# Patient Record
Sex: MALE | Race: WHITE | NOT HISPANIC OR LATINO | Employment: OTHER | URBAN - NONMETROPOLITAN AREA
[De-identification: names, ages, dates, MRNs, and addresses within clinical notes are randomized per-mention and may not be internally consistent; named-entity substitution may affect disease eponyms.]

---

## 2017-05-11 RX ORDER — METOPROLOL SUCCINATE 100 MG/1
100 TABLET, EXTENDED RELEASE ORAL DAILY
COMMUNITY

## 2017-05-11 RX ORDER — ATORVASTATIN CALCIUM 40 MG/1
40 TABLET, FILM COATED ORAL DAILY
COMMUNITY

## 2017-05-11 RX ORDER — ASPIRIN 81 MG/1
81 TABLET ORAL DAILY
COMMUNITY

## 2017-05-11 RX ORDER — FUROSEMIDE 40 MG/1
40 TABLET ORAL DAILY
COMMUNITY

## 2017-05-11 RX ORDER — LEVOTHYROXINE SODIUM 0.1 MG/1
100 TABLET ORAL DAILY
COMMUNITY

## 2017-05-11 RX ORDER — VALSARTAN AND HYDROCHLOROTHIAZIDE 320; 12.5 MG/1; MG/1
1 TABLET, FILM COATED ORAL DAILY
COMMUNITY

## 2017-05-15 ENCOUNTER — OFFICE VISIT (OUTPATIENT)
Dept: CARDIOLOGY | Facility: CLINIC | Age: 77
End: 2017-05-15

## 2017-05-15 VITALS
WEIGHT: 252 LBS | DIASTOLIC BLOOD PRESSURE: 62 MMHG | BODY MASS INDEX: 34.13 KG/M2 | HEART RATE: 77 BPM | SYSTOLIC BLOOD PRESSURE: 130 MMHG | HEIGHT: 72 IN

## 2017-05-15 DIAGNOSIS — I25.10 CAD IN NATIVE ARTERY: ICD-10-CM

## 2017-05-15 DIAGNOSIS — I10 ESSENTIAL HYPERTENSION: ICD-10-CM

## 2017-05-15 DIAGNOSIS — E78.5 HYPERLIPIDEMIA, UNSPECIFIED HYPERLIPIDEMIA TYPE: ICD-10-CM

## 2017-05-15 DIAGNOSIS — R06.02 SOB (SHORTNESS OF BREATH): Primary | ICD-10-CM

## 2017-05-15 PROCEDURE — 99204 OFFICE O/P NEW MOD 45 MIN: CPT | Performed by: INTERNAL MEDICINE

## 2017-05-15 PROCEDURE — 93000 ELECTROCARDIOGRAM COMPLETE: CPT | Performed by: INTERNAL MEDICINE

## 2017-05-15 RX ORDER — OMEGA-3S/DHA/EPA/FISH OIL/D3 300MG-1000
CAPSULE ORAL DAILY
COMMUNITY

## 2017-05-15 RX ORDER — CHOLECALCIFEROL (VITAMIN D3) 25 MCG
CAPSULE ORAL DAILY
COMMUNITY

## 2017-06-02 ENCOUNTER — HOSPITAL ENCOUNTER (OUTPATIENT)
Dept: CARDIOLOGY | Facility: HOSPITAL | Age: 77
End: 2017-06-02
Attending: INTERNAL MEDICINE

## 2017-06-02 ENCOUNTER — HOSPITAL ENCOUNTER (OUTPATIENT)
Dept: CARDIOLOGY | Facility: HOSPITAL | Age: 77
Discharge: HOME | End: 2017-06-02
Attending: INTERNAL MEDICINE

## 2017-06-02 ENCOUNTER — HOSPITAL ENCOUNTER (OUTPATIENT)
Dept: CARDIOLOGY | Facility: HOSPITAL | Age: 77
Discharge: HOME | End: 2017-06-02
Attending: INTERNAL MEDICINE | Admitting: INTERNAL MEDICINE

## 2017-06-02 DIAGNOSIS — R06.02 SOB (SHORTNESS OF BREATH): ICD-10-CM

## 2017-06-02 DIAGNOSIS — I25.10 CAD IN NATIVE ARTERY: ICD-10-CM

## 2017-06-02 PROCEDURE — 0 TECHNETIUM SESTAMIBI: Performed by: INTERNAL MEDICINE

## 2017-06-02 PROCEDURE — A9500 TC99M SESTAMIBI: HCPCS | Performed by: INTERNAL MEDICINE

## 2017-06-02 RX ADMIN — Medication 1 DOSE: at 07:40

## 2017-11-28 ENCOUNTER — TELEPHONE (OUTPATIENT)
Dept: GASTROENTEROLOGY | Facility: CLINIC | Age: 77
End: 2017-11-28

## 2018-03-21 ENCOUNTER — OFFICE VISIT (OUTPATIENT)
Dept: GASTROENTEROLOGY | Facility: CLINIC | Age: 78
End: 2018-03-21

## 2018-03-21 VITALS
OXYGEN SATURATION: 98 % | SYSTOLIC BLOOD PRESSURE: 130 MMHG | HEIGHT: 72 IN | BODY MASS INDEX: 34.13 KG/M2 | WEIGHT: 252 LBS | DIASTOLIC BLOOD PRESSURE: 80 MMHG | HEART RATE: 94 BPM

## 2018-03-21 DIAGNOSIS — Z86.010 HX OF ADENOMATOUS COLONIC POLYPS: Primary | ICD-10-CM

## 2018-03-21 DIAGNOSIS — E66.9 OBESITY, UNSPECIFIED OBESITY SEVERITY, UNSPECIFIED OBESITY TYPE: ICD-10-CM

## 2018-03-21 DIAGNOSIS — I10 ESSENTIAL HYPERTENSION: ICD-10-CM

## 2018-03-21 PROCEDURE — S0260 H&P FOR SURGERY: HCPCS | Performed by: CLINICAL NURSE SPECIALIST

## 2018-03-21 NOTE — PROGRESS NOTES
Kashif Milan  1940      3/21/2018  Chief Complaint   Patient presents with   • Colonoscopy     Subjective   HPI  Kashif Milan is a 77 y.o. male who presents as a referral for preventative maintenance. He has no complaints of nausea or vomiting. No change in bowels. No wt loss. No BRBPR. No melena. There is no family hx for colon cancer. No abdominal pain. Since we have seen him last he has been diagnosed with progressive advanced COPD he is on oxygen daily and he is chronically short of breath he is unsure he wants colonoscopy.  Past Medical History:   Diagnosis Date   • Atrial fibrillation    • CAD in native artery    • Essential hypertension    • GERD (gastroesophageal reflux disease)    • H/O basal cell carcinoma excision     ear x3, 1-13   • H/O spinal fusion    • History of MI (myocardial infarction)    • Hyperlipemia    • Ischemic heart disease    • Myocardial infarction    • Obesity    • S/P CABG (coronary artery bypass graft)    • SOB (shortness of breath)      Past Surgical History:   Procedure Laterality Date   • BACK SURGERY     • CARDIAC CATHETERIZATION Left 10/06/2005    ct surgery consult pending   • CIRCUMCISION     • COLONOSCOPY  02/09/2015    Adenomatous polyp hepatic flexure, 2 hyperplastic polyps at 60 and 30 cm, Diverticulosis repeat exam in 3 years   • CORONARY ARTERY BYPASS GRAFT  04/14/2009    x4, LIMA to LAD, SVG to the intermediate branch, obtuse marginal branch, posterior anterior descending artery   • SPINAL FUSION       Outpatient Prescriptions Marked as Taking for the 3/21/18 encounter (Office Visit) with DARRIAN Archuleta   Medication Sig Dispense Refill   • aspirin 81 MG EC tablet Take 81 mg by mouth Daily.     • atorvastatin (LIPITOR) 40 MG tablet Take 40 mg by mouth Daily.     • Cholecalciferol (VITAMIN D-3) 1000 UNITS capsule Take  by mouth Daily.     • fluticasone-salmeterol (ADVAIR) 250-50 MCG/DOSE DISKUS Inhale 2 (Two) Times a Day.     • furosemide (LASIX) 20  MG tablet Take 40 mg by mouth Daily As Needed.     • levothyroxine (SYNTHROID, LEVOTHROID) 100 MCG tablet Take 100 mcg by mouth Daily.     • metoprolol succinate XL (TOPROL-XL) 100 MG 24 hr tablet Take 100 mg by mouth Daily.     • Multiple Vitamins-Minerals (MULTIVITAMIN MEN 50+ PO) Take  by mouth Daily.     • O2 (OXYGEN) Inhale 1 (One) Time. 2 1/2 Liters full time     • Omega-3 Fatty Acids (FISH OIL BURP-LESS) 1200 MG capsule Take  by mouth Daily.     • valsartan-hydrochlorothiazide (DIOVAN-HCT) 320-12.5 MG per tablet Take 1 tablet by mouth Daily.       Allergies   Allergen Reactions   • Contrast Dye Other (See Comments)     MRI dye causes kidneys to shut down   • Metformin And Related Itching     Social History     Social History   • Marital status:      Spouse name: N/A   • Number of children: N/A   • Years of education: N/A     Occupational History   • Not on file.     Social History Main Topics   • Smoking status: Never Smoker   • Smokeless tobacco: Never Used   • Alcohol use Yes      Comment: rarely   • Drug use: No   • Sexual activity: Defer     Other Topics Concern   • Not on file     Social History Narrative   • No narrative on file     Family History   Problem Relation Age of Onset   • Hypertension Mother    • Heart attack Father    • Liver cancer Father    • Hypertension Sister    • Hypertension Brother    • Colon cancer Neg Hx    • Colon polyps Neg Hx      Health Maintenance   Topic Date Due   • TDAP/TD VACCINES (1 - Tdap) 07/14/1959   • PNEUMOCOCCAL VACCINES (65+ LOW/MEDIUM RISK) (1 of 2 - PCV13) 07/14/2004   • MEDICARE ANNUAL WELLNESS  05/08/2017   • ZOSTER VACCINE  05/08/2017   • LIPID PANEL  05/11/2017   • INFLUENZA VACCINE  08/01/2017       REVIEW OF SYSTEMS  General: well appearing, no fever chills or sweats, no unexplained wt loss  HEENT: no acute visual or hearing disturbances  Cardiovascular: No chest pain or palpitations  Pulmonary: No shortness of breath, coughing, wheezing or  "hemoptysis  : No burning, urgency, hematuria, or dysuria  Musculoskeletal: No joint pain or stiffness  Peripheral: no edema  Skin: No lesions or rashes  Neuro: No dizziness, headaches, stroke, syncope  Endocrine: No hot or cold intolerances  Hematological: No blood dyscrasias    Objective   Vitals:    03/21/18 1340   BP: 130/80   Pulse: 94   SpO2: 98%   Weight: 114 kg (252 lb)   Height: 182.9 cm (72\")     Body mass index is 34.18 kg/m².  Discussed the patient's BMI with him. BMI is above normal parameters. Follow-up plan includes:  nutrition counseling.      PHYSICAL EXAM  General: age appropriate well nourished well appearing, no acute distress  Head: normocephalic and atraumatic  Global assessment-supple  Neck-No JVD noted, no lymphadenopathy  Pulmonary-clear to auscultation bilaterally, normal respiratory effort  Cardiovascular-normal rate and rhythm, normal heart sounds, S1 and S2 noted  Abdomen-soft, non tender, non distended, normal bowel sounds all 4 quadrants, no hepatosplenomegaly noted  Extremities-No clubbing cyanosis or edema  Neuro-Non focal, converses appropriately, awake, alert, oriented    Assessment/Plan     Kashif was seen today for colonoscopy.    Diagnoses and all orders for this visit:    Hx of adenomatous colonic polyps    Essential hypertension  Comments:  cont BP medication the day of procedure    Obesity, unspecified obesity severity, unspecified obesity type    long discussion with patient and his wife and he is unsure that he needs to consider colonoscopy due to his lung status. He is aware of all risks benefits and alternatives and he does not want to have colonoscopy. He says that he is aware that polyps and colon cancer could go undiagnosed and he is ok with this. He does not want a colonoscopy at this time due to risk vs benefit.       Body mass index is 34.18 kg/m².    Patient instructions on prep prior to procedure provided to the patient.    All risks, benefits, alternatives, and " indications of colonoscopy procedure have been discussed with the patient. Risks to include perforation of the colon requiring possible surgery or colostomy, risk of bleeding from biopsies or removal of colon tissue, possibility of missing a colon polyp or cancer, or adverse drug reaction.  Benefits to include the diagnosis and management of disease of the colon and rectum. Alternatives to include barium enema, radiographic evaluation, lab testing or no intervention. Pt verbalizes understanding and agrees.     Tessa Bloom, APRN  3/21/2018  2:26 PM      IF YOU SMOKE OR USE TOBACCO PLEASE READ THE FOLLOWIN minutes reading provided    Why is smoking bad for me?  Smoking increases the risk of heart disease, lung disease, vascular disease, stroke, and cancer.     If you smoke, STOP!    If you would like more information on quitting smoking, please visit the [employer] website: [email or web address]/healthier-together/smoke   This link will provide additional resources including the QUIT line and the Beat the Pack support groups.     For more information:    Quit Now [place]  -QUIT-NOW  https://[email or web address].org/en-[place]/    Obesity, Adult  Obesity is the condition of having too much total body fat. Being overweight or obese means that your weight is greater than what is considered healthy for your body size. Obesity is determined by a measurement called BMI. BMI is an estimate of body fat and is calculated from height and weight. For adults, a BMI of 30 or higher is considered obese.  Obesity can eventually lead to other health concerns and major illnesses, including:  · Stroke.  · Coronary artery disease (CAD).  · Type 2 diabetes.  · Some types of cancer, including cancers of the colon, breast, uterus, and gallbladder.  · Osteoarthritis.  · High blood pressure (hypertension).  · High cholesterol.  · Sleep apnea.  · Gallbladder stones.  · Infertility problems.  What are the  causes?  The main cause of obesity is taking in (consuming) more calories than your body uses for energy. Other factors that contribute to this condition may include:  · Being born with genes that make you more likely to become obese.  · Having a medical condition that causes obesity. These conditions include:  ¨ Hypothyroidism.  ¨ Polycystic ovarian syndrome (PCOS).  ¨ Binge-eating disorder.  ¨ Cushing syndrome.  · Taking certain medicines, such as steroids, antidepressants, and seizure medicines.  · Not being physically active (sedentary lifestyle).  · Living where there are limited places to exercise safely or buy healthy foods.  · Not getting enough sleep.  What increases the risk?  The following factors may increase your risk of this condition:  · Having a family history of obesity.  · Being a woman of -American descent.  · Being a man of  descent.  What are the signs or symptoms?  Having excessive body fat is the main symptom of this condition.  How is this diagnosed?  This condition may be diagnosed based on:  · Your symptoms.  · Your medical history.  · A physical exam. Your health care provider may measure:  ¨ Your BMI. If you are an adult with a BMI between 25 and less than 30, you are considered overweight. If you are an adult with a BMI of 30 or higher, you are considered obese.  ¨ The distances around your hips and your waist (circumferences). These may be compared to each other to help diagnose your condition.  ¨ Your skinfold thickness. Your health care provider may gently pinch a fold of your skin and measure it.  How is this treated?  Treatment for this condition often includes changing your lifestyle. Treatment may include some or all of the following:  · Dietary changes. Work with your health care provider and a dietitian to set a weight-loss goal that is healthy and reasonable for you. Dietary changes may include eating:  ¨ Smaller portions. A portion size is the amount of a  particular food that is healthy for you to eat at one time. This varies from person to person.  ¨ Low-calorie or low-fat options.  ¨ More whole grains, fruits, and vegetables.  · Regular physical activity. This may include aerobic activity (cardio) and strength training.  · Medicine to help you lose weight. Your health care provider may prescribe medicine if you are unable to lose 1 pound a week after 6 weeks of eating more healthily and doing more physical activity.  · Surgery. Surgical options may include gastric banding and gastric bypass. Surgery may be done if:  ¨ Other treatments have not helped to improve your condition.  ¨ You have a BMI of 40 or higher.  ¨ You have life-threatening health problems related to obesity.  Follow these instructions at home:     Eating and drinking     · Follow recommendations from your health care provider about what you eat and drink. Your health care provider may advise you to:  ¨ Limit fast foods, sweets, and processed snack foods.  ¨ Choose low-fat options, such as low-fat milk instead of whole milk.  ¨ Eat 5 or more servings of fruits or vegetables every day.  ¨ Eat at home more often. This gives you more control over what you eat.  ¨ Choose healthy foods when you eat out.  ¨ Learn what a healthy portion size is.  ¨ Keep low-fat snacks on hand.  ¨ Avoid sugary drinks, such as soda, fruit juice, iced tea sweetened with sugar, and flavored milk.  ¨ Eat a healthy breakfast.  · Drink enough water to keep your urine clear or pale yellow.  · Do not go without eating for long periods of time (do not fast) or follow a fad diet. Fasting and fad diets can be unhealthy and even dangerous.  Physical Activity   · Exercise regularly, as told by your health care provider. Ask your health care provider what types of exercise are safe for you and how often you should exercise.  · Warm up and stretch before being active.  · Cool down and stretch after being active.  · Rest between periods of  activity.  Lifestyle   · Limit the time that you spend in front of your TV, computer, or video game system.  · Find ways to reward yourself that do not involve food.  · Limit alcohol intake to no more than 1 drink a day for nonpregnant women and 2 drinks a day for men. One drink equals 12 oz of beer, 5 oz of wine, or 1½ oz of hard liquor.  General instructions   · Keep a weight loss journal to keep track of the food you eat and how much you exercise you get.  · Take over-the-counter and prescription medicines only as told by your health care provider.  · Take vitamins and supplements only as told by your health care provider.  · Consider joining a support group. Your health care provider may be able to recommend a support group.  · Keep all follow-up visits as told by your health care provider. This is important.  Contact a health care provider if:  · You are unable to meet your weight loss goal after 6 weeks of dietary and lifestyle changes.  This information is not intended to replace advice given to you by your health care provider. Make sure you discuss any questions you have with your health care provider.  Document Released: 01/25/2006 Document Revised: 05/22/2017 Document Reviewed: 10/05/2016  [employer] Interactive Patient Education © 2017 [employer]

## 2018-05-18 ENCOUNTER — OFFICE VISIT (OUTPATIENT)
Dept: CARDIOLOGY | Facility: CLINIC | Age: 78
End: 2018-05-18

## 2018-05-18 VITALS
SYSTOLIC BLOOD PRESSURE: 110 MMHG | WEIGHT: 251 LBS | BODY MASS INDEX: 34 KG/M2 | HEART RATE: 74 BPM | DIASTOLIC BLOOD PRESSURE: 62 MMHG | OXYGEN SATURATION: 95 % | HEIGHT: 72 IN

## 2018-05-18 DIAGNOSIS — E78.5 HYPERLIPIDEMIA, UNSPECIFIED HYPERLIPIDEMIA TYPE: ICD-10-CM

## 2018-05-18 DIAGNOSIS — Z99.81 ON HOME OXYGEN THERAPY: ICD-10-CM

## 2018-05-18 DIAGNOSIS — R06.02 SOB (SHORTNESS OF BREATH): ICD-10-CM

## 2018-05-18 DIAGNOSIS — I25.10 CORONARY ARTERY DISEASE INVOLVING NATIVE CORONARY ARTERY OF NATIVE HEART WITHOUT ANGINA PECTORIS: Primary | ICD-10-CM

## 2018-05-18 DIAGNOSIS — E66.09 CLASS 1 OBESITY DUE TO EXCESS CALORIES WITH SERIOUS COMORBIDITY AND BODY MASS INDEX (BMI) OF 34.0 TO 34.9 IN ADULT: ICD-10-CM

## 2018-05-18 DIAGNOSIS — I10 ESSENTIAL HYPERTENSION: ICD-10-CM

## 2018-05-18 PROCEDURE — 99214 OFFICE O/P EST MOD 30 MIN: CPT | Performed by: NURSE PRACTITIONER

## 2018-05-18 PROCEDURE — 93000 ELECTROCARDIOGRAM COMPLETE: CPT | Performed by: NURSE PRACTITIONER

## 2018-05-18 NOTE — PATIENT INSTRUCTIONS

## 2018-05-18 NOTE — PROGRESS NOTES
Subjective:     Encounter Date:05/18/2018      Patient ID: Kashif Milan is a 77 y.o. male with a history of coronary artery disease, chronic lung disease, chronic kidney disease, hypertension, and hyperlipidemia. He presents today for one year follow up.    Chief Complaint: Follow up  Coronary Artery Disease   Presents for follow-up visit. Symptoms include shortness of breath. Pertinent negatives include no chest pain, chest pressure, chest tightness, dizziness, leg swelling, palpitations or weight gain. The symptoms have been stable. Compliance with diet is variable. Compliance with exercise is variable. Compliance with medications is good.   Shortness of Breath   This is a chronic problem. The current episode started more than 1 year ago. The problem occurs daily. The problem has been unchanged. Pertinent negatives include no abdominal pain, chest pain, fever, headaches, hemoptysis, leg swelling, orthopnea, PND, rash, sore throat, sputum production, syncope, vomiting or wheezing. The patient has no known risk factors for DVT/PE. He has tried beta agonist inhalers for the symptoms. The treatment provided significant relief. His past medical history is significant for CAD and chronic lung disease. There is no history of a heart failure.   Hypertension   This is a chronic problem. The current episode started more than 1 year ago. The problem is unchanged. The problem is controlled. Associated symptoms include shortness of breath. Pertinent negatives include no chest pain, headaches, malaise/fatigue, orthopnea, palpitations or PND. Risk factors for coronary artery disease include dyslipidemia, male gender and obesity. Past treatments include diuretics, angiotensin blockers and beta blockers. Current antihypertension treatment includes diuretics, angiotensin blockers and beta blockers. The current treatment provides significant improvement. Compliance problems include diet.  Hypertensive end-organ damage  includes kidney disease and CAD/MI. There is no history of angina or heart failure.     Overall, the patient has been doing well. He remains short of breath to some degree which is his usual state of health. This is stable and unchanged. He follows with Dr. Li and is on home oxygen 24/7.  He had a stress test ordered due to his shortness of breath last year, to identify if this was in fact related to his CAD history. However, the patient suffers from claustrophobia and was unable to take the test. As far as his symptoms, they have not worsened since last year and have remained stable.  He is walking daily on a treadmill at home and tolerating this.  He denies chest pain, pressure, or similar. His blood pressure remains controlled and his PCP follows his cholesterol. He has no complaints today.    The following portions of the patient's history were reviewed and updated as appropriate: allergies, current medications, past family history, past medical history, past social history and past surgical history.     Allergies   Allergen Reactions   • Contrast Dye Other (See Comments)     MRI dye causes kidneys to shut down   • Metformin And Related Itching       Current Outpatient Prescriptions:   •  aspirin 81 MG EC tablet, Take 81 mg by mouth Daily., Disp: , Rfl:   •  atorvastatin (LIPITOR) 40 MG tablet, Take 40 mg by mouth Daily., Disp: , Rfl:   •  Cholecalciferol (VITAMIN D-3) 1000 UNITS capsule, Take  by mouth Daily., Disp: , Rfl:   •  fluticasone-salmeterol (ADVAIR) 250-50 MCG/DOSE DISKUS, Inhale 2 (Two) Times a Day., Disp: , Rfl:   •  furosemide (LASIX) 20 MG tablet, Take 40 mg by mouth Daily., Disp: , Rfl:   •  levothyroxine (SYNTHROID, LEVOTHROID) 100 MCG tablet, Take 100 mcg by mouth Daily., Disp: , Rfl:   •  metoprolol succinate XL (TOPROL-XL) 100 MG 24 hr tablet, Take 100 mg by mouth Daily., Disp: , Rfl:   •  Multiple Vitamins-Minerals (MULTIVITAMIN MEN 50+ PO), Take  by mouth Daily., Disp: , Rfl:   •  O2  (OXYGEN), Inhale 1 (One) Time. 2 1/2 Liters full time, Disp: , Rfl:   •  Omega-3 Fatty Acids (FISH OIL BURP-LESS) 1200 MG capsule, Take  by mouth Daily., Disp: , Rfl:   •  valsartan-hydrochlorothiazide (DIOVAN-HCT) 320-12.5 MG per tablet, Take 1 tablet by mouth Daily., Disp: , Rfl:     Past Medical History:   Diagnosis Date   • Atrial fibrillation    • CAD in native artery    • COPD (chronic obstructive pulmonary disease)    • Essential hypertension    • GERD (gastroesophageal reflux disease)    • H/O basal cell carcinoma excision     ear x3, 1-13   • H/O spinal fusion    • History of MI (myocardial infarction)    • Hyperlipemia    • Ischemic heart disease    • Myocardial infarction    • Obesity    • S/P CABG (coronary artery bypass graft)    • SOB (shortness of breath)      Family History   Problem Relation Age of Onset   • Hypertension Mother    • Heart attack Father    • Liver cancer Father    • Hypertension Sister    • Hypertension Brother    • Colon cancer Neg Hx    • Colon polyps Neg Hx      Social History     Social History   • Marital status:      Spouse name: N/A   • Number of children: N/A   • Years of education: N/A     Occupational History   • Not on file.     Social History Main Topics   • Smoking status: Never Smoker   • Smokeless tobacco: Never Used   • Alcohol use Yes      Comment: rarely   • Drug use: No   • Sexual activity: Defer     Other Topics Concern   • Not on file     Social History Narrative   • No narrative on file     Past Surgical History:   Procedure Laterality Date   • BACK SURGERY     • CARDIAC CATHETERIZATION Left 10/06/2005    ct surgery consult pending   • CIRCUMCISION     • COLONOSCOPY  02/09/2015    Adenomatous polyp hepatic flexure, 2 hyperplastic polyps at 60 and 30 cm, Diverticulosis repeat exam in 3 years   • CORONARY ARTERY BYPASS GRAFT  04/14/2009    x4, LIMA to LAD, SVG to the intermediate branch, obtuse marginal branch, posterior anterior descending artery   •  SPINAL FUSION       Review of Systems   Constitution: Negative for fever, malaise/fatigue, night sweats, weight gain and weight loss.   HENT: Negative for sore throat.    Eyes: Negative for visual disturbance.   Cardiovascular: Negative for chest pain, dyspnea on exertion, leg swelling, orthopnea, palpitations, paroxysmal nocturnal dyspnea and syncope.   Respiratory: Positive for shortness of breath. Negative for chest tightness, hemoptysis, sputum production and wheezing.    Hematologic/Lymphatic: Negative for bleeding problem.   Skin: Negative for poor wound healing, rash and skin cancer.   Musculoskeletal: Negative for falls.   Gastrointestinal: Negative for bloating, abdominal pain, nausea and vomiting.   Genitourinary: Negative for hematuria.   Neurological: Negative for dizziness, headaches, light-headedness and loss of balance.   Psychiatric/Behavioral: Negative for altered mental status. The patient is not nervous/anxious.    All other systems reviewed and are negative.        ECG 12 Lead  Date/Time: 5/18/2018 10:19 AM  Performed by: HTEO SCHAEFER  Authorized by: THEO SCHAEFER   Comparison: compared with previous ECG from 5/15/2017  Similar to previous ECG  Rhythm: sinus rhythm  Rate: normal  BPM: 74  Conduction: conduction normal  T depression: III and aVR  Clinical impression: abnormal ECG and low voltage          Vitals:    05/18/18 0826   BP: 110/62   Pulse: 74   SpO2: 95%     1    05/18/18  0826   Weight: 114 kg (251 lb)          Objective:     Physical Exam   Constitutional: He is oriented to person, place, and time. He appears well-developed and well-nourished. No distress. Nasal cannula in place.   HENT:   Head: Normocephalic and atraumatic.   Mouth/Throat: Oropharynx is clear and moist. No oropharyngeal exudate.   Eyes: Conjunctivae are normal. No scleral icterus.   Neck: Normal range of motion. Neck supple.   Cardiovascular: Normal rate, regular rhythm and normal heart sounds.  Exam reveals no  gallop and no friction rub.    No murmur heard.  Pulmonary/Chest: Effort normal. No respiratory distress. He has decreased breath sounds. He has no wheezes. He has no rales.   Abdominal: Soft. Normal appearance. He exhibits no distension. There is no tenderness.   Musculoskeletal: He exhibits no edema.   Neurological: He is alert and oriented to person, place, and time.   Skin: Skin is warm, dry and intact. No rash noted. He is not diaphoretic. No erythema. No pallor.   Psychiatric: He has a normal mood and affect. His behavior is normal.   Vitals reviewed.      Lab Review:       Assessment:          Diagnosis Plan   1. Coronary artery disease involving native coronary artery of native heart without angina pectoris     2. Essential hypertension     3. Hyperlipidemia, unspecified hyperlipidemia type     4. SOB (shortness of breath)     5. Class 1 obesity due to excess calories with serious comorbidity and body mass index (BMI) of 34.0 to 34.9 in adult     6. On home oxygen therapy            Plan:         - CAD: No chest pain, pressure, or similar. Chronic, stable shortness of breath. On daily aspirin.  No clinical evidence suspicious for ischemia. Continue aspirin, statin, betablocker, and ARB.    -HTN: controlled. Continue same.    - HLD: reports control follows with PCP. On statin    - SOB: Follows with Dr. Li, reports that his lung status is stable.    - Obesity: new exercise regimen with treadmill at home. BMI  provided.    - Home O2: 24/7    RTC: follow up in 1 year

## 2019-03-05 PROCEDURE — 86235 NUCLEAR ANTIGEN ANTIBODY: CPT | Performed by: INTERNAL MEDICINE

## 2019-03-05 PROCEDURE — 84443 ASSAY THYROID STIM HORMONE: CPT | Performed by: INTERNAL MEDICINE

## 2019-03-05 PROCEDURE — 82607 VITAMIN B-12: CPT | Performed by: INTERNAL MEDICINE

## 2019-03-05 PROCEDURE — 86225 DNA ANTIBODY NATIVE: CPT | Performed by: INTERNAL MEDICINE

## 2019-03-05 PROCEDURE — 86038 ANTINUCLEAR ANTIBODIES: CPT | Performed by: INTERNAL MEDICINE

## 2019-03-05 PROCEDURE — 83540 ASSAY OF IRON: CPT | Performed by: INTERNAL MEDICINE

## 2019-03-05 PROCEDURE — 82746 ASSAY OF FOLIC ACID SERUM: CPT | Performed by: INTERNAL MEDICINE

## 2019-03-05 PROCEDURE — 80053 COMPREHEN METABOLIC PANEL: CPT | Performed by: INTERNAL MEDICINE

## 2019-03-05 PROCEDURE — 83615 LACTATE (LD) (LDH) ENZYME: CPT | Performed by: INTERNAL MEDICINE

## 2019-03-05 PROCEDURE — 84439 ASSAY OF FREE THYROXINE: CPT | Performed by: INTERNAL MEDICINE

## 2019-03-05 PROCEDURE — 82728 ASSAY OF FERRITIN: CPT | Performed by: INTERNAL MEDICINE

## 2019-03-05 PROCEDURE — 83550 IRON BINDING TEST: CPT | Performed by: INTERNAL MEDICINE

## 2019-03-12 ENCOUNTER — TRANSCRIBE ORDERS (OUTPATIENT)
Dept: ADMINISTRATIVE | Facility: HOSPITAL | Age: 79
End: 2019-03-12

## 2019-03-12 ENCOUNTER — LAB REQUISITION (OUTPATIENT)
Dept: LAB | Facility: HOSPITAL | Age: 79
End: 2019-03-12

## 2019-03-12 DIAGNOSIS — D75.89 MACROCYTOSIS: ICD-10-CM

## 2019-03-12 DIAGNOSIS — D50.9 IRON DEFICIENCY ANEMIA: ICD-10-CM

## 2019-03-12 DIAGNOSIS — D72.829 LEUKOCYTOSIS, UNSPECIFIED TYPE: Primary | ICD-10-CM

## 2019-03-12 LAB
ALBUMIN SERPL-MCNC: 3.8 G/DL (ref 3.5–5)
ALBUMIN/GLOB SERPL: 1.2 G/DL (ref 1.1–2.5)
ALP SERPL-CCNC: 77 U/L (ref 24–120)
ALT SERPL W P-5'-P-CCNC: 20 U/L (ref 0–54)
ANION GAP SERPL CALCULATED.3IONS-SCNC: ABNORMAL MMOL/L (ref 4–13)
AST SERPL-CCNC: 22 U/L (ref 7–45)
BILIRUB SERPL-MCNC: 0.5 MG/DL (ref 0.1–1)
BUN BLD-MCNC: 42 MG/DL (ref 5–21)
BUN/CREAT SERPL: 26.8 (ref 7–25)
CALCIUM SPEC-SCNC: 9.6 MG/DL (ref 8.4–10.4)
CHLORIDE SERPL-SCNC: 88 MMOL/L (ref 98–110)
CO2 SERPL-SCNC: >40 MMOL/L (ref 24–31)
CREAT BLD-MCNC: 1.57 MG/DL (ref 0.5–1.4)
FERRITIN SERPL-MCNC: 90.3 NG/ML (ref 17.9–464)
FOLATE SERPL-MCNC: >20 NG/ML (ref 4.78–24.2)
GFR SERPL CREATININE-BSD FRML MDRD: 43 ML/MIN/1.73
GLOBULIN UR ELPH-MCNC: 3.3 GM/DL
GLUCOSE BLD-MCNC: 144 MG/DL (ref 70–100)
IRON 24H UR-MRATE: 77 MCG/DL (ref 42–180)
IRON SATN MFR SERPL: 26 % (ref 20–45)
LDH SERPL-CCNC: 379 U/L (ref 265–665)
POTASSIUM BLD-SCNC: 5.2 MMOL/L (ref 3.5–5.3)
PROT SERPL-MCNC: 7.1 G/DL (ref 6.3–8.7)
SODIUM BLD-SCNC: 142 MMOL/L (ref 135–145)
T4 FREE SERPL-MCNC: 1.07 NG/DL (ref 0.78–2.19)
TIBC SERPL-MCNC: 300 MCG/DL (ref 225–420)
TSH SERPL DL<=0.05 MIU/L-ACNC: 3.81 MIU/ML (ref 0.47–4.68)
VIT B12 BLD-MCNC: 808 PG/ML (ref 239–931)

## 2019-03-18 LAB
ANA HOMOGEN TITR SER: ABNORMAL {TITER}
ANA SER QL IA: POSITIVE
CENTROMERE B AB SER-ACNC: <0.2 AI (ref 0–0.9)
CHROMATIN AB SERPL-ACNC: <0.2 AI (ref 0–0.9)
DSDNA AB SER-ACNC: 1 IU/ML (ref 0–9)
ENA JO1 AB SER-ACNC: <0.2 AI (ref 0–0.9)
ENA RNP AB SER-ACNC: 0.3 AI (ref 0–0.9)
ENA SCL70 AB SER-ACNC: <0.2 AI (ref 0–0.9)
ENA SM AB SER-ACNC: <0.2 AI (ref 0–0.9)
ENA SS-A AB SER-ACNC: <0.2 AI (ref 0–0.9)
ENA SS-B AB SER-ACNC: <0.2 AI (ref 0–0.9)
Lab: ABNORMAL

## 2019-03-19 ENCOUNTER — LAB REQUISITION (OUTPATIENT)
Dept: LAB | Facility: HOSPITAL | Age: 79
End: 2019-03-19

## 2019-03-19 DIAGNOSIS — Z00.00 ENCOUNTER FOR GENERAL ADULT MEDICAL EXAMINATION WITHOUT ABNORMAL FINDINGS: ICD-10-CM

## 2019-03-19 LAB
BASOPHILS # BLD AUTO: 0.04 10*3/MM3 (ref 0–0.2)
BASOPHILS NFR BLD AUTO: 0.3 % (ref 0–2)
DEPRECATED RDW RBC AUTO: 53.1 FL (ref 40–54)
EOSINOPHIL # BLD AUTO: 0.2 10*3/MM3 (ref 0–0.7)
EOSINOPHIL NFR BLD AUTO: 1.7 % (ref 0–4)
ERYTHROCYTE [DISTWIDTH] IN BLOOD BY AUTOMATED COUNT: 14 % (ref 12–15)
HCT VFR BLD AUTO: 31.7 % (ref 40–52)
HGB BLD-MCNC: 9 G/DL (ref 14–18)
IMM GRANULOCYTES # BLD AUTO: 0.04 10*3/MM3 (ref 0–0.05)
IMM GRANULOCYTES NFR BLD AUTO: 0.3 % (ref 0–5)
LYMPHOCYTES # BLD AUTO: 2.68 10*3/MM3 (ref 0.72–4.86)
LYMPHOCYTES NFR BLD AUTO: 23.3 % (ref 15–45)
MCH RBC QN AUTO: 29.8 PG (ref 28–32)
MCHC RBC AUTO-ENTMCNC: 28.4 G/DL (ref 33–36)
MCV RBC AUTO: 105 FL (ref 82–95)
MONOCYTES # BLD AUTO: 0.81 10*3/MM3 (ref 0.19–1.3)
MONOCYTES NFR BLD AUTO: 7 % (ref 4–12)
NEUTROPHILS # BLD AUTO: 7.72 10*3/MM3 (ref 1.87–8.4)
NEUTROPHILS NFR BLD AUTO: 67.4 % (ref 39–78)
NRBC BLD AUTO-RTO: 0 /100 WBC (ref 0–0)
PLATELET # BLD AUTO: 238 10*3/MM3 (ref 130–400)
PMV BLD AUTO: 11.8 FL (ref 6–12)
RBC # BLD AUTO: 3.02 10*6/MM3 (ref 4.8–5.9)
WBC NRBC COR # BLD: 11.49 10*3/MM3 (ref 4.8–10.8)

## 2019-03-19 PROCEDURE — 85025 COMPLETE CBC W/AUTO DIFF WBC: CPT | Performed by: INTERNAL MEDICINE

## 2019-03-25 ENCOUNTER — HOSPITAL ENCOUNTER (OUTPATIENT)
Dept: CT IMAGING | Facility: HOSPITAL | Age: 79
Discharge: HOME | End: 2019-03-25
Admitting: RADIOLOGY

## 2019-03-25 VITALS
RESPIRATION RATE: 20 BRPM | WEIGHT: 242.5 LBS | OXYGEN SATURATION: 98 % | HEIGHT: 72 IN | DIASTOLIC BLOOD PRESSURE: 45 MMHG | HEART RATE: 68 BPM | TEMPERATURE: 97.1 F | BODY MASS INDEX: 32.85 KG/M2 | SYSTOLIC BLOOD PRESSURE: 114 MMHG

## 2019-03-25 DIAGNOSIS — D75.89 MACROCYTOSIS: ICD-10-CM

## 2019-03-25 DIAGNOSIS — D72.829 LEUKOCYTOSIS, UNSPECIFIED TYPE: ICD-10-CM

## 2019-03-25 LAB
BASOPHILS # BLD AUTO: 0.04 10*3/MM3 (ref 0–0.2)
BASOPHILS NFR BLD AUTO: 0.4 % (ref 0–2)
DEPRECATED RDW RBC AUTO: 50.3 FL (ref 40–54)
EOSINOPHIL # BLD AUTO: 0.18 10*3/MM3 (ref 0–0.7)
EOSINOPHIL NFR BLD AUTO: 1.6 % (ref 0–4)
ERYTHROCYTE [DISTWIDTH] IN BLOOD BY AUTOMATED COUNT: 14 % (ref 12–15)
HCT VFR BLD AUTO: 29.5 % (ref 40–52)
HGB BLD-MCNC: 8.8 G/DL (ref 14–18)
IMM GRANULOCYTES # BLD AUTO: 0.03 10*3/MM3 (ref 0–0.05)
IMM GRANULOCYTES NFR BLD AUTO: 0.3 % (ref 0–5)
LYMPHOCYTES # BLD AUTO: 2.15 10*3/MM3 (ref 0.72–4.86)
LYMPHOCYTES NFR BLD AUTO: 19.5 % (ref 15–45)
MCH RBC QN AUTO: 29.9 PG (ref 28–32)
MCHC RBC AUTO-ENTMCNC: 29.8 G/DL (ref 33–36)
MCV RBC AUTO: 100.3 FL (ref 82–95)
MONOCYTES # BLD AUTO: 0.88 10*3/MM3 (ref 0.19–1.3)
MONOCYTES NFR BLD AUTO: 8 % (ref 4–12)
NEUTROPHILS # BLD AUTO: 7.77 10*3/MM3 (ref 1.87–8.4)
NEUTROPHILS NFR BLD AUTO: 70.2 % (ref 39–78)
NRBC BLD AUTO-RTO: 0 /100 WBC (ref 0–0)
PLATELET # BLD AUTO: 232 10*3/MM3 (ref 130–400)
PMV BLD AUTO: 10.9 FL (ref 6–12)
RBC # BLD AUTO: 2.94 10*6/MM3 (ref 4.8–5.9)
WBC NRBC COR # BLD: 11.05 10*3/MM3 (ref 4.8–10.8)

## 2019-03-25 PROCEDURE — 88342 IMHCHEM/IMCYTCHM 1ST ANTB: CPT

## 2019-03-25 PROCEDURE — 88184 FLOWCYTOMETRY/ TC 1 MARKER: CPT | Performed by: INTERNAL MEDICINE

## 2019-03-25 PROCEDURE — 85025 COMPLETE CBC W/AUTO DIFF WBC: CPT | Performed by: RADIOLOGY

## 2019-03-25 PROCEDURE — 88313 SPECIAL STAINS GROUP 2: CPT

## 2019-03-25 PROCEDURE — 88323 CONSLTJ&REPRT MATRL PREP SLD: CPT

## 2019-03-25 PROCEDURE — 88280 CHROMOSOME KARYOTYPE STUDY: CPT | Performed by: INTERNAL MEDICINE

## 2019-03-25 PROCEDURE — 88237 TISSUE CULTURE BONE MARROW: CPT | Performed by: INTERNAL MEDICINE

## 2019-03-25 PROCEDURE — 88341 IMHCHEM/IMCYTCHM EA ADD ANTB: CPT

## 2019-03-25 PROCEDURE — 77012 CT SCAN FOR NEEDLE BIOPSY: CPT

## 2019-03-25 PROCEDURE — 88264 CHROMOSOME ANALYSIS 20-25: CPT | Performed by: INTERNAL MEDICINE

## 2019-03-25 PROCEDURE — 88305 TISSUE EXAM BY PATHOLOGIST: CPT | Performed by: INTERNAL MEDICINE

## 2019-03-25 PROCEDURE — 88311 DECALCIFY TISSUE: CPT | Performed by: INTERNAL MEDICINE

## 2019-03-25 PROCEDURE — 88305 TISSUE EXAM BY PATHOLOGIST: CPT

## 2019-03-25 PROCEDURE — 88185 FLOWCYTOMETRY/TC ADD-ON: CPT | Performed by: INTERNAL MEDICINE

## 2019-03-25 PROCEDURE — 88313 SPECIAL STAINS GROUP 2: CPT | Performed by: INTERNAL MEDICINE

## 2019-03-25 RX ORDER — SODIUM CHLORIDE 0.9 % (FLUSH) 0.9 %
3 SYRINGE (ML) INJECTION EVERY 12 HOURS SCHEDULED
Status: DISCONTINUED | OUTPATIENT
Start: 2019-03-25 | End: 2019-03-26 | Stop reason: HOSPADM

## 2019-03-25 RX ORDER — LIDOCAINE HYDROCHLORIDE 10 MG/ML
INJECTION, SOLUTION INFILTRATION; PERINEURAL
Status: COMPLETED | OUTPATIENT
Start: 2019-03-25 | End: 2019-03-25

## 2019-03-25 RX ORDER — SODIUM CHLORIDE 0.9 % (FLUSH) 0.9 %
3-10 SYRINGE (ML) INJECTION AS NEEDED
Status: DISCONTINUED | OUTPATIENT
Start: 2019-03-25 | End: 2019-03-26 | Stop reason: HOSPADM

## 2019-03-25 RX ADMIN — LIDOCAINE HYDROCHLORIDE 10 ML: 10 INJECTION, SOLUTION INFILTRATION; PERINEURAL at 11:59

## 2019-04-08 LAB
LAB AP CASE REPORT: NORMAL
PATH REPORT.FINAL DX SPEC: NORMAL
PATH REPORT.GROSS SPEC: NORMAL

## 2019-04-09 ENCOUNTER — LAB REQUISITION (OUTPATIENT)
Dept: LAB | Facility: HOSPITAL | Age: 79
End: 2019-04-09

## 2019-04-09 DIAGNOSIS — Z00.00 ENCOUNTER FOR GENERAL ADULT MEDICAL EXAMINATION WITHOUT ABNORMAL FINDINGS: ICD-10-CM

## 2019-04-09 LAB
FERRITIN SERPL-MCNC: 202 NG/ML (ref 17.9–464)
IRON 24H UR-MRATE: 61 MCG/DL (ref 42–180)
IRON SATN MFR SERPL: 20 % (ref 20–45)
TIBC SERPL-MCNC: 301 MCG/DL (ref 225–420)

## 2019-04-09 PROCEDURE — 83540 ASSAY OF IRON: CPT | Performed by: INTERNAL MEDICINE

## 2019-04-09 PROCEDURE — 82728 ASSAY OF FERRITIN: CPT | Performed by: INTERNAL MEDICINE

## 2019-04-09 PROCEDURE — 83550 IRON BINDING TEST: CPT | Performed by: INTERNAL MEDICINE

## 2019-04-09 PROCEDURE — 80053 COMPREHEN METABOLIC PANEL: CPT | Performed by: INTERNAL MEDICINE

## 2019-04-10 LAB
ALBUMIN SERPL-MCNC: 3.7 G/DL (ref 3.5–5)
ALBUMIN/GLOB SERPL: 1.1 G/DL (ref 1.1–2.5)
ALP SERPL-CCNC: 84 U/L (ref 24–120)
ALT SERPL W P-5'-P-CCNC: 15 U/L (ref 0–54)
ANION GAP SERPL CALCULATED.3IONS-SCNC: ABNORMAL MMOL/L (ref 4–13)
AST SERPL-CCNC: 21 U/L (ref 7–45)
BILIRUB SERPL-MCNC: 0.7 MG/DL (ref 0.1–1)
BUN BLD-MCNC: 36 MG/DL (ref 5–21)
BUN/CREAT SERPL: 26.1 (ref 7–25)
CALCIUM SPEC-SCNC: 9.5 MG/DL (ref 8.4–10.4)
CHLORIDE SERPL-SCNC: 89 MMOL/L (ref 98–110)
CO2 SERPL-SCNC: >40 MMOL/L (ref 24–31)
CREAT BLD-MCNC: 1.38 MG/DL (ref 0.5–1.4)
GFR SERPL CREATININE-BSD FRML MDRD: 50 ML/MIN/1.73
GLOBULIN UR ELPH-MCNC: 3.4 GM/DL
GLUCOSE BLD-MCNC: 136 MG/DL (ref 70–100)
POTASSIUM BLD-SCNC: 5.2 MMOL/L (ref 3.5–5.3)
PROT SERPL-MCNC: 7.1 G/DL (ref 6.3–8.7)
SODIUM BLD-SCNC: 144 MMOL/L (ref 135–145)

## 2019-04-16 ENCOUNTER — LAB REQUISITION (OUTPATIENT)
Dept: LAB | Facility: HOSPITAL | Age: 79
End: 2019-04-16

## 2019-04-16 DIAGNOSIS — Z00.00 ENCOUNTER FOR GENERAL ADULT MEDICAL EXAMINATION WITHOUT ABNORMAL FINDINGS: ICD-10-CM

## 2019-04-16 PROCEDURE — 84155 ASSAY OF PROTEIN SERUM: CPT | Performed by: INTERNAL MEDICINE

## 2019-04-16 PROCEDURE — 83883 ASSAY NEPHELOMETRY NOT SPEC: CPT | Performed by: INTERNAL MEDICINE

## 2019-04-16 PROCEDURE — 86334 IMMUNOFIX E-PHORESIS SERUM: CPT | Performed by: INTERNAL MEDICINE

## 2019-04-16 PROCEDURE — 84165 PROTEIN E-PHORESIS SERUM: CPT | Performed by: INTERNAL MEDICINE

## 2019-04-16 PROCEDURE — 82784 ASSAY IGA/IGD/IGG/IGM EACH: CPT | Performed by: INTERNAL MEDICINE

## 2019-04-18 LAB
IGA SERPL-MCNC: 406 MG/DL (ref 61–437)
IGG SERPL-MCNC: 1236 MG/DL (ref 700–1600)
IGM SERPL-MCNC: 41 MG/DL (ref 15–143)
PROT PATTERN SERPL IFE-IMP: NORMAL

## 2019-04-19 LAB
ALBUMIN SERPL-MCNC: 3.3 G/DL (ref 2.9–4.4)
ALBUMIN/GLOB SERPL: 0.9 {RATIO} (ref 0.7–1.7)
ALPHA1 GLOB FLD ELPH-MCNC: 0.3 G/DL (ref 0–0.4)
ALPHA2 GLOB SERPL ELPH-MCNC: 0.9 G/DL (ref 0.4–1)
B-GLOBULIN SERPL ELPH-MCNC: 1.4 G/DL (ref 0.7–1.3)
GAMMA GLOB SERPL ELPH-MCNC: 1.4 G/DL (ref 0.4–1.8)
GLOBULIN SER CALC-MCNC: 4 G/DL (ref 2.2–3.9)
IGA SERPL-MCNC: 395 MG/DL (ref 61–437)
IGG SERPL-MCNC: 1275 MG/DL (ref 700–1600)
IGM SERPL-MCNC: 39 MG/DL (ref 15–143)
INTERPRETATION SERPL IEP-IMP: ABNORMAL
KAPPA LC SERPL-MCNC: 65.6 MG/L (ref 3.3–19.4)
KAPPA LC/LAMBDA SER: 1.5 {RATIO} (ref 0.26–1.65)
LAMBDA LC FREE SERPL-MCNC: 43.7 MG/L (ref 5.7–26.3)
Lab: ABNORMAL
M-SPIKE: ABNORMAL G/DL
PROT SERPL-MCNC: 7.3 G/DL (ref 6–8.5)

## 2019-05-07 ENCOUNTER — LAB REQUISITION (OUTPATIENT)
Dept: LAB | Facility: HOSPITAL | Age: 79
End: 2019-05-07

## 2019-05-07 DIAGNOSIS — Z00.00 ENCOUNTER FOR GENERAL ADULT MEDICAL EXAMINATION WITHOUT ABNORMAL FINDINGS: ICD-10-CM

## 2019-05-07 LAB
ALBUMIN SERPL-MCNC: 3.8 G/DL (ref 3.5–5)
ALBUMIN/GLOB SERPL: 1.2 G/DL (ref 1.1–2.5)
ALP SERPL-CCNC: 75 U/L (ref 24–120)
ALT SERPL W P-5'-P-CCNC: 17 U/L (ref 0–54)
ANION GAP SERPL CALCULATED.3IONS-SCNC: ABNORMAL MMOL/L (ref 4–13)
AST SERPL-CCNC: 24 U/L (ref 7–45)
BILIRUB SERPL-MCNC: 0.4 MG/DL (ref 0.1–1)
BUN BLD-MCNC: 33 MG/DL (ref 5–21)
BUN/CREAT SERPL: 26 (ref 7–25)
CALCIUM SPEC-SCNC: 9.1 MG/DL (ref 8.4–10.4)
CHLORIDE SERPL-SCNC: 91 MMOL/L (ref 98–110)
CO2 SERPL-SCNC: >40 MMOL/L (ref 24–31)
CREAT BLD-MCNC: 1.27 MG/DL (ref 0.5–1.4)
FERRITIN SERPL-MCNC: 92.7 NG/ML (ref 17.9–464)
GFR SERPL CREATININE-BSD FRML MDRD: 55 ML/MIN/1.73
GLOBULIN UR ELPH-MCNC: 3.2 GM/DL
GLUCOSE BLD-MCNC: 102 MG/DL (ref 70–100)
IRON 24H UR-MRATE: 52 MCG/DL (ref 42–180)
IRON SATN MFR SERPL: 18 % (ref 20–45)
POTASSIUM BLD-SCNC: 5 MMOL/L (ref 3.5–5.3)
PROT SERPL-MCNC: 7 G/DL (ref 6.3–8.7)
SODIUM BLD-SCNC: 142 MMOL/L (ref 135–145)
TIBC SERPL-MCNC: 292 MCG/DL (ref 225–420)

## 2019-05-07 PROCEDURE — 83540 ASSAY OF IRON: CPT | Performed by: INTERNAL MEDICINE

## 2019-05-07 PROCEDURE — 80053 COMPREHEN METABOLIC PANEL: CPT | Performed by: INTERNAL MEDICINE

## 2019-05-07 PROCEDURE — 82728 ASSAY OF FERRITIN: CPT | Performed by: INTERNAL MEDICINE

## 2019-05-07 PROCEDURE — 83550 IRON BINDING TEST: CPT | Performed by: INTERNAL MEDICINE

## 2019-07-02 ENCOUNTER — LAB REQUISITION (OUTPATIENT)
Dept: LAB | Facility: HOSPITAL | Age: 79
End: 2019-07-02

## 2019-07-02 DIAGNOSIS — Z00.00 ENCOUNTER FOR GENERAL ADULT MEDICAL EXAMINATION WITHOUT ABNORMAL FINDINGS: ICD-10-CM

## 2019-07-02 LAB
ALBUMIN SERPL-MCNC: 4 G/DL (ref 3.5–5)
ALBUMIN/GLOB SERPL: 1.3 G/DL (ref 1.1–2.5)
ALP SERPL-CCNC: 82 U/L (ref 24–120)
ALT SERPL W P-5'-P-CCNC: 21 U/L (ref 0–54)
ANION GAP SERPL CALCULATED.3IONS-SCNC: ABNORMAL MMOL/L (ref 4–13)
AST SERPL-CCNC: 20 U/L (ref 7–45)
BILIRUB SERPL-MCNC: 0.5 MG/DL (ref 0.1–1)
BUN BLD-MCNC: 29 MG/DL (ref 5–21)
BUN/CREAT SERPL: 25 (ref 7–25)
CALCIUM SPEC-SCNC: 9.1 MG/DL (ref 8.4–10.4)
CHLORIDE SERPL-SCNC: 92 MMOL/L (ref 98–110)
CO2 SERPL-SCNC: >40 MMOL/L (ref 24–31)
CREAT BLD-MCNC: 1.16 MG/DL (ref 0.5–1.4)
FERRITIN SERPL-MCNC: 109 NG/ML (ref 17.9–464)
GFR SERPL CREATININE-BSD FRML MDRD: 61 ML/MIN/1.73
GLOBULIN UR ELPH-MCNC: 3.1 GM/DL
GLUCOSE BLD-MCNC: 134 MG/DL (ref 70–100)
IRON 24H UR-MRATE: 71 MCG/DL (ref 42–180)
IRON SATN MFR SERPL: 25 % (ref 20–45)
POTASSIUM BLD-SCNC: 4.3 MMOL/L (ref 3.5–5.3)
PROT SERPL-MCNC: 7.1 G/DL (ref 6.3–8.7)
SODIUM BLD-SCNC: 141 MMOL/L (ref 135–145)
TIBC SERPL-MCNC: 283 MCG/DL (ref 225–420)

## 2019-07-02 PROCEDURE — 80053 COMPREHEN METABOLIC PANEL: CPT | Performed by: INTERNAL MEDICINE

## 2019-07-02 PROCEDURE — 83540 ASSAY OF IRON: CPT | Performed by: INTERNAL MEDICINE

## 2019-07-02 PROCEDURE — 82728 ASSAY OF FERRITIN: CPT | Performed by: INTERNAL MEDICINE

## 2019-07-02 PROCEDURE — 83550 IRON BINDING TEST: CPT | Performed by: INTERNAL MEDICINE

## 2019-07-26 ENCOUNTER — OFFICE VISIT (OUTPATIENT)
Dept: CARDIOLOGY | Facility: CLINIC | Age: 79
End: 2019-07-26

## 2019-07-26 VITALS
BODY MASS INDEX: 32.23 KG/M2 | SYSTOLIC BLOOD PRESSURE: 136 MMHG | OXYGEN SATURATION: 91 % | DIASTOLIC BLOOD PRESSURE: 70 MMHG | HEIGHT: 72 IN | HEART RATE: 73 BPM | WEIGHT: 238 LBS

## 2019-07-26 DIAGNOSIS — E78.2 MIXED HYPERLIPIDEMIA: ICD-10-CM

## 2019-07-26 DIAGNOSIS — I25.10 CORONARY ARTERY DISEASE INVOLVING NATIVE CORONARY ARTERY OF NATIVE HEART, ANGINA PRESENCE UNSPECIFIED: Primary | ICD-10-CM

## 2019-07-26 DIAGNOSIS — Z87.09 HISTORY OF COPD: ICD-10-CM

## 2019-07-26 DIAGNOSIS — I10 ESSENTIAL HYPERTENSION: ICD-10-CM

## 2019-07-26 DIAGNOSIS — R06.02 SHORTNESS OF BREATH: ICD-10-CM

## 2019-07-26 PROCEDURE — 99214 OFFICE O/P EST MOD 30 MIN: CPT | Performed by: NURSE PRACTITIONER

## 2019-07-26 PROCEDURE — 93000 ELECTROCARDIOGRAM COMPLETE: CPT | Performed by: NURSE PRACTITIONER

## 2019-07-26 RX ORDER — ALLOPURINOL 100 MG/1
100 TABLET ORAL DAILY
COMMUNITY

## 2019-07-26 NOTE — PROGRESS NOTES
"    Subjective:     Encounter Date:07/26/2019      Patient ID: Kashif Milan is a 79 y.o. male.    Chief Complaint: shortness of breath   The patient presents for his yearly follow up for CAD s/p CABG 2006. He continues to have chronic shortness of breath, which he relates to his severe COPD (on home oxygen). He admits fatigue and a decline in his stamina as well. He has lower extremity edema, which is chronic and primarily in his RLE. He relates this to vein harvesting from his CABG. However, he reports he did have a hospitalization at an [facility] for volume overload and diuresis 1-2 years ago and he continues to take lasix daily. He denies chest pain, palpitations, syncope, or pre syncope. He is compliant with his medications and reports good blood pressure control. He never had his nuclear stress test ordered by Dr. Davalos 2 years ago because he refused due to claustrophobia (the test was ordered to try to discern if some his dyspnea may be secondary to coronary ischemia). However, the patient is convinced it is not cardiac related.         The following portions of the patient's history were reviewed and updated as appropriate: allergies, current medications, past family history, past medical history, past social history, past surgical history and problem list.  /70 (BP Location: Right arm, Patient Position: Sitting)   Pulse 73   Ht 182.9 cm (72\")   Wt 108 kg (238 lb)   SpO2 91%   BMI 32.28 kg/m²   Allergies   Allergen Reactions   • Contrast Dye Other (See Comments)     MRI dye causes kidneys to shut down   • Metformin And Related Itching       Current Outpatient Medications:   •  allopurinol (ZYLOPRIM) 100 MG tablet, Take 100 mg by mouth Daily., Disp: , Rfl:   •  aspirin 81 MG EC tablet, Take 81 mg by mouth Daily., Disp: , Rfl:   •  atorvastatin (LIPITOR) 40 MG tablet, Take 40 mg by mouth Daily., Disp: , Rfl:   •  Cholecalciferol (VITAMIN D-3) 1000 UNITS capsule, Take  by mouth Daily., Disp: , Rfl: "   •  fluticasone-salmeterol (ADVAIR) 250-50 MCG/DOSE DISKUS, Inhale 2 (Two) Times a Day., Disp: , Rfl:   •  furosemide (LASIX) 20 MG tablet, Take 40 mg by mouth Daily., Disp: , Rfl:   •  levothyroxine (SYNTHROID, LEVOTHROID) 100 MCG tablet, Take 100 mcg by mouth Daily., Disp: , Rfl:   •  metoprolol succinate XL (TOPROL-XL) 100 MG 24 hr tablet, Take 100 mg by mouth Daily., Disp: , Rfl:   •  Multiple Vitamins-Minerals (MULTIVITAMIN MEN 50+ PO), Take  by mouth Daily., Disp: , Rfl:   •  O2 (OXYGEN), Inhale 1 (One) Time. 2 1/2 Liters full time, Disp: , Rfl:   •  Omega-3 Fatty Acids (FISH OIL BURP-LESS) 1200 MG capsule, Take  by mouth Daily., Disp: , Rfl:   •  valsartan-hydrochlorothiazide (DIOVAN-HCT) 320-12.5 MG per tablet, Take 1 tablet by mouth Daily., Disp: , Rfl:   Past Medical History:   Diagnosis Date   • Atrial fibrillation (CMS/HCC)    • CAD in native artery    • COPD (chronic obstructive pulmonary disease) (CMS/HCC)    • Essential hypertension    • GERD (gastroesophageal reflux disease)    • H/O basal cell carcinoma excision     ear x3, 1-13   • H/O spinal fusion    • History of MI (myocardial infarction)    • Hyperlipemia    • Ischemic heart disease    • Myocardial infarction (CMS/HCC)    • Obesity    • S/P CABG (coronary artery bypass graft)    • SOB (shortness of breath)      Past Surgical History:   Procedure Laterality Date   • BACK SURGERY     • CARDIAC CATHETERIZATION Left 10/06/2005    ct surgery consult pending   • CIRCUMCISION     • COLONOSCOPY  02/09/2015    Adenomatous polyp hepatic flexure, 2 hyperplastic polyps at 60 and 30 cm, Diverticulosis repeat exam in 3 years   • CORONARY ARTERY BYPASS GRAFT  04/14/2009    x4, LIMA to LAD, SVG to the intermediate branch, obtuse marginal branch, posterior anterior descending artery   • SPINAL FUSION       Social History     Socioeconomic History   • Marital status:      Spouse name: Not on file   • Number of children: Not on file   • Years of  education: Not on file   • Highest education level: Not on file   Tobacco Use   • Smoking status: Never Smoker   • Smokeless tobacco: Never Used   Substance and Sexual Activity   • Alcohol use: Yes     Comment: rarely   • Drug use: No   • Sexual activity: Defer     Family History   Problem Relation Age of Onset   • Hypertension Mother    • Heart attack Father    • Liver cancer Father    • Hypertension Sister    • Hypertension Brother    • Colon cancer Neg Hx    • Colon polyps Neg Hx        Review of Systems   Constitution: Positive for weakness and malaise/fatigue. Negative for chills, diaphoresis and fever.   HENT: Negative for nosebleeds.    Eyes: Negative for visual disturbance.   Cardiovascular: Positive for dyspnea on exertion and leg swelling. Negative for chest pain, claudication, cyanosis, irregular heartbeat, near-syncope, orthopnea, palpitations, paroxysmal nocturnal dyspnea and syncope.   Respiratory: Positive for shortness of breath. Negative for cough, hemoptysis, sputum production and wheezing.    Hematologic/Lymphatic: Negative for bleeding problem.   Skin: Negative for color change and flushing.   Musculoskeletal: Negative for falls.   Gastrointestinal: Negative for bloating, abdominal pain, hematemesis, hematochezia, melena, nausea and vomiting.   Genitourinary: Negative for hematuria.   Neurological: Negative for dizziness and light-headedness.   Psychiatric/Behavioral: Negative for altered mental status.         ECG 12 Lead  Date/Time: 7/26/2019 4:08 PM  Performed by: She Mariee APRN  Authorized by: She Mariee APRN   Comparison: compared with previous ECG from 5/18/2018  Similar to previous ECG  Rhythm: sinus rhythm               Objective:     Physical Exam   Constitutional: He is oriented to person, place, and time. He appears well-developed and well-nourished. No distress.   HENT:   Head: Normocephalic and atraumatic.   Eyes: Pupils are equal, round, and reactive to light.   Neck: Normal  range of motion. Neck supple. No JVD present. No thyromegaly present.   Cardiovascular: Normal rate, regular rhythm and intact distal pulses. Exam reveals no gallop and no friction rub.   Murmur (2/6 systolic murmur ) heard.  Pulmonary/Chest: Effort normal. No respiratory distress. He has decreased breath sounds. He has no wheezes. He has no rales. He exhibits no tenderness.   Abdominal: Soft. Bowel sounds are normal. He exhibits no distension. There is no tenderness.   Musculoskeletal: Normal range of motion. He exhibits edema (BLE edema, at least 2+, greater on the right ).   Neurological: He is alert and oriented to person, place, and time. No cranial nerve deficit.   Skin: Skin is warm and dry. He is not diaphoretic.   Psychiatric: He has a normal mood and affect. His behavior is normal.           Assessment:          Diagnosis Plan   1. Coronary artery disease involving native coronary artery of native heart, angina presence unspecified    Stable  Hx of CABG in 2006    2. Shortness of breath    See notes in HPI      3. Essential hypertension    Borderline elevated today , typically well controlled, continue to monitor      4. Mixed hyperlipidemia    Continue high intensity statin, obtain lipid panel from pcp      5. COPD  Followed by Dr. Li  On home oxygen  Likely the primary source of his dyspnea, although cannot rule out a cardiac component (chronic chf and/or anginal equivalent)           Plan:       As noted above   Pt continues to refuse nuclear stress testing as previously ordered by Dr. Davalos to help determine if a component of his dyspnea may be ischemic in nature  Check 2d echo due to dyspnea, murmur, and peripheral edema, in the setting of known coronary disease and remote CABG - further recommendations pending these results   Continue aspirin, atorvastatin, lasix, toprol xl, and arb/hctz  Follow up 1 month     Reviewed signs and symptoms of CHF and what to report with the patient. Patient  instructed to restrict sodium and weigh daily. Report weight gain of greater than 2 lbs overnight or 5 lbs in 1 week. Pt verbalized understanding of instructions and plan of care.

## 2019-08-02 ENCOUNTER — HOSPITAL ENCOUNTER (OUTPATIENT)
Dept: CARDIOLOGY | Facility: HOSPITAL | Age: 79
Discharge: HOME | End: 2019-08-02
Admitting: NURSE PRACTITIONER

## 2019-08-02 VITALS
SYSTOLIC BLOOD PRESSURE: 136 MMHG | DIASTOLIC BLOOD PRESSURE: 70 MMHG | WEIGHT: 238.1 LBS | HEIGHT: 72 IN | BODY MASS INDEX: 32.25 KG/M2

## 2019-08-02 DIAGNOSIS — R06.02 SHORTNESS OF BREATH: ICD-10-CM

## 2019-08-02 DIAGNOSIS — I25.10 CORONARY ARTERY DISEASE INVOLVING NATIVE CORONARY ARTERY OF NATIVE HEART, ANGINA PRESENCE UNSPECIFIED: ICD-10-CM

## 2019-08-02 LAB
BH CV ECHO MEAS - AO MAX PG (FULL): 8.8 MMHG
BH CV ECHO MEAS - AO MAX PG: 10.8 MMHG
BH CV ECHO MEAS - AO MEAN PG (FULL): 4 MMHG
BH CV ECHO MEAS - AO MEAN PG: 5 MMHG
BH CV ECHO MEAS - AO ROOT AREA (BSA CORRECTED): 1.3
BH CV ECHO MEAS - AO ROOT AREA: 6.6 CM^2
BH CV ECHO MEAS - AO ROOT DIAM: 2.9 CM
BH CV ECHO MEAS - AO V2 MAX: 164 CM/SEC
BH CV ECHO MEAS - AO V2 MEAN: 107 CM/SEC
BH CV ECHO MEAS - AO V2 VTI: 31.1 CM
BH CV ECHO MEAS - AVA(I,A): 1.9 CM^2
BH CV ECHO MEAS - AVA(I,D): 1.9 CM^2
BH CV ECHO MEAS - AVA(V,A): 1.5 CM^2
BH CV ECHO MEAS - AVA(V,D): 1.5 CM^2
BH CV ECHO MEAS - BSA(HAYCOCK): 2.4 M^2
BH CV ECHO MEAS - BSA: 2.3 M^2
BH CV ECHO MEAS - BZI_BMI: 32.3 KILOGRAMS/M^2
BH CV ECHO MEAS - BZI_METRIC_HEIGHT: 182.9 CM
BH CV ECHO MEAS - BZI_METRIC_WEIGHT: 108 KG
BH CV ECHO MEAS - EDV(CUBED): 59.3 ML
BH CV ECHO MEAS - EDV(MOD-SP4): 119 ML
BH CV ECHO MEAS - EDV(TEICH): 65.9 ML
BH CV ECHO MEAS - EF(CUBED): 57.2 %
BH CV ECHO MEAS - EF(MOD-SP4): 50.8 %
BH CV ECHO MEAS - EF(TEICH): 49.5 %
BH CV ECHO MEAS - ESV(CUBED): 25.4 ML
BH CV ECHO MEAS - ESV(MOD-SP4): 58.6 ML
BH CV ECHO MEAS - ESV(TEICH): 33.3 ML
BH CV ECHO MEAS - FS: 24.6 %
BH CV ECHO MEAS - IVS/LVPW: 0.84
BH CV ECHO MEAS - IVSD: 0.85 CM
BH CV ECHO MEAS - LA DIMENSION: 3.9 CM
BH CV ECHO MEAS - LA/AO: 1.3
BH CV ECHO MEAS - LAT PEAK E' VEL: 7.3 CM/SEC
BH CV ECHO MEAS - LV DIASTOLIC VOL/BSA (35-75): 51.9 ML/M^2
BH CV ECHO MEAS - LV MASS(C)D: 110.2 GRAMS
BH CV ECHO MEAS - LV MASS(C)DI: 48.1 GRAMS/M^2
BH CV ECHO MEAS - LV MAX PG: 2 MMHG
BH CV ECHO MEAS - LV MEAN PG: 1 MMHG
BH CV ECHO MEAS - LV SYSTOLIC VOL/BSA (12-30): 25.5 ML/M^2
BH CV ECHO MEAS - LV V1 MAX: 70.5 CM/SEC
BH CV ECHO MEAS - LV V1 MEAN: 44 CM/SEC
BH CV ECHO MEAS - LV V1 VTI: 16.7 CM
BH CV ECHO MEAS - LVIDD: 3.9 CM
BH CV ECHO MEAS - LVIDS: 2.9 CM
BH CV ECHO MEAS - LVLD AP4: 8.9 CM
BH CV ECHO MEAS - LVLS AP4: 7.7 CM
BH CV ECHO MEAS - LVOT AREA (M): 3.5 CM^2
BH CV ECHO MEAS - LVOT AREA: 3.5 CM^2
BH CV ECHO MEAS - LVOT DIAM: 2.1 CM
BH CV ECHO MEAS - LVPWD: 1 CM
BH CV ECHO MEAS - MED PEAK E' VEL: 5.77 CM/SEC
BH CV ECHO MEAS - MV A MAX VEL: 79.9 CM/SEC
BH CV ECHO MEAS - MV DEC TIME: 0.31 SEC
BH CV ECHO MEAS - MV E MAX VEL: 67.9 CM/SEC
BH CV ECHO MEAS - MV E/A: 0.85
BH CV ECHO MEAS - SI(AO): 89.6 ML/M^2
BH CV ECHO MEAS - SI(CUBED): 14.8 ML/M^2
BH CV ECHO MEAS - SI(LVOT): 25.2 ML/M^2
BH CV ECHO MEAS - SI(MOD-SP4): 26.3 ML/M^2
BH CV ECHO MEAS - SI(TEICH): 14.2 ML/M^2
BH CV ECHO MEAS - SV(AO): 205.4 ML
BH CV ECHO MEAS - SV(CUBED): 33.9 ML
BH CV ECHO MEAS - SV(LVOT): 57.8 ML
BH CV ECHO MEAS - SV(MOD-SP4): 60.4 ML
BH CV ECHO MEAS - SV(TEICH): 32.6 ML
BH CV ECHO MEAS - TR MAX VEL: 154 CM/SEC
BH CV ECHO MEASUREMENTS AVERAGE E/E' RATIO: 10.39
LEFT ATRIUM VOLUME INDEX: 35.8 ML/M2
LEFT ATRIUM VOLUME: 81.9 CM3
MAXIMAL PREDICTED HEART RATE: 140 BPM
STRESS TARGET HR: 119 BPM

## 2019-08-02 PROCEDURE — 93306 TTE W/DOPPLER COMPLETE: CPT | Performed by: INTERNAL MEDICINE

## 2019-08-02 PROCEDURE — 93306 TTE W/DOPPLER COMPLETE: CPT

## 2019-08-02 PROCEDURE — 25010000002 PERFLUTREN 6.52 MG/ML SUSPENSION: Performed by: NURSE PRACTITIONER

## 2019-08-02 RX ADMIN — PERFLUTREN: 6.52 INJECTION, SUSPENSION INTRAVENOUS at 16:33

## 2019-09-24 ENCOUNTER — LAB (OUTPATIENT)
Dept: ONCOLOGY | Facility: CLINIC | Age: 79
End: 2019-09-24

## 2019-09-24 DIAGNOSIS — D64.9 ANEMIA, UNSPECIFIED TYPE: Primary | ICD-10-CM

## 2019-09-24 DIAGNOSIS — D64.9 ANEMIA, UNSPECIFIED TYPE: ICD-10-CM

## 2019-09-24 LAB
ALBUMIN SERPL-MCNC: 3.8 G/DL (ref 3.5–5.2)
ALBUMIN/GLOB SERPL: 1 G/DL
ALP SERPL-CCNC: 76 U/L (ref 39–117)
ALT SERPL W P-5'-P-CCNC: 11 U/L (ref 1–41)
ANION GAP SERPL CALCULATED.3IONS-SCNC: 8 MMOL/L (ref 5–15)
AST SERPL-CCNC: 17 U/L (ref 1–40)
BASOPHILS # BLD AUTO: 0.05 10*3/MM3 (ref 0–0.2)
BASOPHILS NFR BLD AUTO: 0.5 % (ref 0–1.5)
BILIRUB SERPL-MCNC: 0.3 MG/DL (ref 0.2–1.2)
BUN BLD-MCNC: 34 MG/DL (ref 8–23)
BUN/CREAT SERPL: 27.2 (ref 7–25)
CALCIUM SPEC-SCNC: 9.4 MG/DL (ref 8.6–10.5)
CHLORIDE SERPL-SCNC: 93 MMOL/L (ref 98–107)
CO2 SERPL-SCNC: 42 MMOL/L (ref 22–29)
CREAT BLD-MCNC: 1.25 MG/DL (ref 0.76–1.27)
DEPRECATED RDW RBC AUTO: 53.5 FL (ref 37–54)
EOSINOPHIL # BLD AUTO: 0.19 10*3/MM3 (ref 0–0.4)
EOSINOPHIL NFR BLD AUTO: 1.9 % (ref 0.3–6.2)
ERYTHROCYTE [DISTWIDTH] IN BLOOD BY AUTOMATED COUNT: 13.8 % (ref 12.3–15.4)
FERRITIN SERPL-MCNC: 199 NG/ML (ref 30–400)
GFR SERPL CREATININE-BSD FRML MDRD: 56 ML/MIN/1.73
GLOBULIN UR ELPH-MCNC: 3.7 GM/DL
GLUCOSE BLD-MCNC: 172 MG/DL (ref 65–99)
HCT VFR BLD AUTO: 29.6 % (ref 37.5–51)
HGB BLD-MCNC: 8.6 G/DL (ref 13–17.7)
IMM GRANULOCYTES # BLD AUTO: 0.02 10*3/MM3 (ref 0–0.05)
IMM GRANULOCYTES NFR BLD AUTO: 0.2 % (ref 0–0.5)
IRON 24H UR-MRATE: 56 MCG/DL (ref 59–158)
IRON SATN MFR SERPL: 19 % (ref 20–50)
LYMPHOCYTES # BLD AUTO: 2.26 10*3/MM3 (ref 0.7–3.1)
LYMPHOCYTES NFR BLD AUTO: 22.1 % (ref 19.6–45.3)
MCH RBC QN AUTO: 30.7 PG (ref 26.6–33)
MCHC RBC AUTO-ENTMCNC: 29.1 G/DL (ref 31.5–35.7)
MCV RBC AUTO: 105.7 FL (ref 79–97)
MONOCYTES # BLD AUTO: 0.64 10*3/MM3 (ref 0.1–0.9)
MONOCYTES NFR BLD AUTO: 6.3 % (ref 5–12)
NEUTROPHILS # BLD AUTO: 7.05 10*3/MM3 (ref 1.7–7)
NEUTROPHILS NFR BLD AUTO: 69 % (ref 42.7–76)
PLATELET # BLD AUTO: 221 10*3/MM3 (ref 140–450)
PMV BLD AUTO: 9.8 FL (ref 6–12)
POTASSIUM BLD-SCNC: 4.7 MMOL/L (ref 3.5–5.2)
PROT SERPL-MCNC: 7.5 G/DL (ref 6–8.5)
RBC # BLD AUTO: 2.8 10*6/MM3 (ref 4.14–5.8)
SODIUM BLD-SCNC: 143 MMOL/L (ref 136–145)
TIBC SERPL-MCNC: 292 MCG/DL (ref 298–536)
TRANSFERRIN SERPL-MCNC: 196 MG/DL (ref 200–360)
WBC NRBC COR # BLD: 10.21 10*3/MM3 (ref 3.4–10.8)

## 2019-09-24 PROCEDURE — 86235 NUCLEAR ANTIGEN ANTIBODY: CPT | Performed by: INTERNAL MEDICINE

## 2019-09-24 PROCEDURE — 83540 ASSAY OF IRON: CPT | Performed by: INTERNAL MEDICINE

## 2019-09-24 PROCEDURE — 86225 DNA ANTIBODY NATIVE: CPT | Performed by: INTERNAL MEDICINE

## 2019-09-24 PROCEDURE — 80053 COMPREHEN METABOLIC PANEL: CPT | Performed by: INTERNAL MEDICINE

## 2019-09-24 PROCEDURE — 85025 COMPLETE CBC W/AUTO DIFF WBC: CPT | Performed by: INTERNAL MEDICINE

## 2019-09-24 PROCEDURE — 36415 COLL VENOUS BLD VENIPUNCTURE: CPT | Performed by: INTERNAL MEDICINE

## 2019-09-24 PROCEDURE — 84466 ASSAY OF TRANSFERRIN: CPT | Performed by: INTERNAL MEDICINE

## 2019-09-24 PROCEDURE — 82728 ASSAY OF FERRITIN: CPT | Performed by: INTERNAL MEDICINE

## 2019-09-26 LAB
CENTROMERE B AB SER-ACNC: <0.2 AI (ref 0–0.9)
CHROMATIN AB SERPL-ACNC: <0.2 AI (ref 0–0.9)
DSDNA AB SER-ACNC: 1 IU/ML (ref 0–9)
ENA JO1 AB SER-ACNC: <0.2 AI (ref 0–0.9)
ENA RNP AB SER-ACNC: 0.3 AI (ref 0–0.9)
ENA SCL70 AB SER-ACNC: <0.2 AI (ref 0–0.9)
ENA SM AB SER-ACNC: <0.2 AI (ref 0–0.9)
ENA SS-A AB SER-ACNC: <0.2 AI (ref 0–0.9)
ENA SS-B AB SER-ACNC: <0.2 AI (ref 0–0.9)
Lab: NORMAL

## 2019-10-01 ENCOUNTER — TELEPHONE (OUTPATIENT)
Dept: ONCOLOGY | Facility: CLINIC | Age: 79
End: 2019-10-01

## 2019-10-01 ENCOUNTER — OFFICE VISIT (OUTPATIENT)
Dept: ONCOLOGY | Facility: CLINIC | Age: 79
End: 2019-10-01

## 2019-10-01 VITALS
DIASTOLIC BLOOD PRESSURE: 70 MMHG | HEART RATE: 61 BPM | SYSTOLIC BLOOD PRESSURE: 120 MMHG | TEMPERATURE: 96.7 F | RESPIRATION RATE: 16 BRPM | BODY MASS INDEX: 31.02 KG/M2 | WEIGHT: 229 LBS | OXYGEN SATURATION: 91 % | HEIGHT: 72 IN

## 2019-10-01 DIAGNOSIS — D50.9 IRON DEFICIENCY ANEMIA, UNSPECIFIED IRON DEFICIENCY ANEMIA TYPE: Primary | ICD-10-CM

## 2019-10-01 DIAGNOSIS — N18.30 STAGE 3 CHRONIC KIDNEY DISEASE (HCC): ICD-10-CM

## 2019-10-01 PROCEDURE — 99214 OFFICE O/P EST MOD 30 MIN: CPT | Performed by: INTERNAL MEDICINE

## 2019-10-01 RX ORDER — DIPHENHYDRAMINE HYDROCHLORIDE 50 MG/ML
50 INJECTION INTRAMUSCULAR; INTRAVENOUS AS NEEDED
Status: CANCELLED | OUTPATIENT
Start: 2019-10-03

## 2019-10-01 RX ORDER — SODIUM CHLORIDE 9 MG/ML
250 INJECTION, SOLUTION INTRAVENOUS ONCE
Status: CANCELLED | OUTPATIENT
Start: 2019-10-03 | End: 2019-10-03

## 2019-10-01 NOTE — TELEPHONE ENCOUNTER
Spoke with patient and let him know that he is scheduled for Injectafer on 10/03/26471 at 1 pm at the [facility].

## 2019-10-03 ENCOUNTER — INFUSION (OUTPATIENT)
Dept: ONCOLOGY | Facility: HOSPITAL | Age: 79
End: 2019-10-03

## 2019-10-03 VITALS
HEIGHT: 72 IN | SYSTOLIC BLOOD PRESSURE: 114 MMHG | DIASTOLIC BLOOD PRESSURE: 51 MMHG | WEIGHT: 238 LBS | HEART RATE: 59 BPM | BODY MASS INDEX: 32.23 KG/M2 | TEMPERATURE: 98.2 F | OXYGEN SATURATION: 100 % | RESPIRATION RATE: 18 BRPM

## 2019-10-03 DIAGNOSIS — D50.9 IRON DEFICIENCY ANEMIA, UNSPECIFIED IRON DEFICIENCY ANEMIA TYPE: Primary | ICD-10-CM

## 2019-10-03 PROCEDURE — 96365 THER/PROPH/DIAG IV INF INIT: CPT

## 2019-10-03 PROCEDURE — 25010000002 FERRIC CARBOXYMALTOSE 750 MG/15ML SOLUTION 15 ML VIAL: Performed by: INTERNAL MEDICINE

## 2019-10-03 RX ORDER — SODIUM CHLORIDE 9 MG/ML
250 INJECTION, SOLUTION INTRAVENOUS ONCE
Status: COMPLETED | OUTPATIENT
Start: 2019-10-03 | End: 2019-10-03

## 2019-10-03 RX ORDER — DIPHENHYDRAMINE HYDROCHLORIDE 50 MG/ML
50 INJECTION INTRAMUSCULAR; INTRAVENOUS AS NEEDED
Status: CANCELLED | OUTPATIENT
Start: 2019-10-03

## 2019-10-03 RX ORDER — SODIUM CHLORIDE 9 MG/ML
250 INJECTION, SOLUTION INTRAVENOUS ONCE
Status: CANCELLED | OUTPATIENT
Start: 2019-10-03 | End: 2019-10-03

## 2019-10-03 RX ORDER — METHYLPREDNISOLONE SODIUM SUCCINATE 125 MG/2ML
125 INJECTION, POWDER, LYOPHILIZED, FOR SOLUTION INTRAMUSCULAR; INTRAVENOUS AS NEEDED
Status: CANCELLED | OUTPATIENT
Start: 2019-10-03

## 2019-10-03 RX ADMIN — SODIUM CHLORIDE 250 ML: 9 INJECTION, SOLUTION INTRAVENOUS at 13:30

## 2019-10-03 RX ADMIN — FERRIC CARBOXYMALTOSE INJECTION 750 MG: 50 INJECTION, SOLUTION INTRAVENOUS at 13:34

## 2019-10-18 ENCOUNTER — OFFICE VISIT (OUTPATIENT)
Dept: CARDIOLOGY | Facility: CLINIC | Age: 79
End: 2019-10-18

## 2019-10-18 VITALS
BODY MASS INDEX: 31.83 KG/M2 | WEIGHT: 235 LBS | RESPIRATION RATE: 18 BRPM | HEIGHT: 72 IN | HEART RATE: 57 BPM | OXYGEN SATURATION: 98 % | DIASTOLIC BLOOD PRESSURE: 70 MMHG | SYSTOLIC BLOOD PRESSURE: 112 MMHG

## 2019-10-18 DIAGNOSIS — I25.10 CORONARY ARTERY DISEASE INVOLVING NATIVE CORONARY ARTERY OF NATIVE HEART WITHOUT ANGINA PECTORIS: Primary | ICD-10-CM

## 2019-10-18 PROCEDURE — 93000 ELECTROCARDIOGRAM COMPLETE: CPT | Performed by: NURSE PRACTITIONER

## 2019-10-18 PROCEDURE — 99214 OFFICE O/P EST MOD 30 MIN: CPT | Performed by: NURSE PRACTITIONER

## 2019-10-18 RX ORDER — NITROGLYCERIN 0.4 MG/1
TABLET SUBLINGUAL
Qty: 25 TABLET | Refills: 1 | Status: SHIPPED | OUTPATIENT
Start: 2019-10-18

## 2019-10-18 NOTE — PROGRESS NOTES
"    Subjective:     Encounter Date:10/18/2019      Patient ID: Kashif Milan is a 79 y.o. male.    Chief Complaint: shortness of breath   The patient presents for his yearly follow up for CAD s/p CABG 2006. He continues to have chronic shortness of breath, which he relates to his severe COPD (on home oxygen). He admits chronic fatigue. He has lower extremity edema, which is chronic and primarily in his RLE. He relates this to vein harvesting from his CABG. However, he reports he did have a hospitalization at an [facility] for volume overload and diuresis 1-2 years ago and he continues to take lasix daily. He denies chest pain, palpitations, syncope, or pre syncope. He is compliant with his medications and reports good blood pressure control. He never had his nuclear stress test ordered by Dr. Davalos  Approximately 2 years ago because he refused due to claustrophobia (the test was ordered to try to discern if some his dyspnea may be secondary to coronary ischemia). He denies any significant changes since his last office visit. He states his pulmonologist told him he has had mild improvement in his lung function.         The following portions of the patient's history were reviewed and updated as appropriate: allergies, current medications, past family history, past medical history, past social history, past surgical history and problem list.  /70 (BP Location: Right arm, Patient Position: Sitting, Cuff Size: Adult)   Pulse 57   Resp 18   Ht 182.9 cm (72\")   Wt 107 kg (235 lb)   SpO2 98%   BMI 31.87 kg/m²   Allergies   Allergen Reactions   • Contrast Dye Other (See Comments)     MRI dye causes kidneys to shut down   • Metformin And Related Itching       Current Outpatient Medications:   •  allopurinol (ZYLOPRIM) 100 MG tablet, Take 100 mg by mouth Daily., Disp: , Rfl:   •  aspirin 81 MG EC tablet, Take 81 mg by mouth Daily., Disp: , Rfl:   •  atorvastatin (LIPITOR) 40 MG tablet, Take 40 mg by mouth Daily., " Disp: , Rfl:   •  Cholecalciferol (VITAMIN D-3) 1000 UNITS capsule, Take  by mouth Daily., Disp: , Rfl:   •  fluticasone-salmeterol (ADVAIR) 250-50 MCG/DOSE DISKUS, Inhale 2 (Two) Times a Day., Disp: , Rfl:   •  furosemide (LASIX) 40 MG tablet, Take 40 mg by mouth Daily., Disp: , Rfl:   •  levothyroxine (SYNTHROID, LEVOTHROID) 100 MCG tablet, Take 100 mcg by mouth Daily., Disp: , Rfl:   •  metoprolol succinate XL (TOPROL-XL) 100 MG 24 hr tablet, Take 100 mg by mouth Daily., Disp: , Rfl:   •  Multiple Vitamins-Minerals (MULTIVITAMIN MEN 50+ PO), Take  by mouth Daily., Disp: , Rfl:   •  O2 (OXYGEN), Inhale 1 (One) Time. 2 1/2 Liters full time, Disp: , Rfl:   •  Omega-3 Fatty Acids (FISH OIL BURP-LESS) 1200 MG capsule, Take  by mouth Daily., Disp: , Rfl:   •  valsartan-hydrochlorothiazide (DIOVAN-HCT) 320-12.5 MG per tablet, Take 1 tablet by mouth Daily., Disp: , Rfl:   •  nitroglycerin (NITROSTAT) 0.4 MG SL tablet, 1 under the tongue as needed for angina, may repeat q5mins for up three doses, Disp: 25 tablet, Rfl: 1    Current Facility-Administered Medications:   •  ferric carboxymaltose (INJECTAFER) injection solution 750 mg, 750 mg, Intravenous, Once, Aleks Tucker MD  Past Medical History:   Diagnosis Date   • Atrial fibrillation (CMS/HCC)    • CAD in native artery    • COPD (chronic obstructive pulmonary disease) (CMS/HCC)    • Essential hypertension    • GERD (gastroesophageal reflux disease)    • H/O basal cell carcinoma excision     ear x3, 1-13   • H/O spinal fusion    • History of MI (myocardial infarction)    • Hyperlipemia    • Ischemic heart disease    • Myocardial infarction (CMS/HCC)    • Obesity    • S/P CABG (coronary artery bypass graft)    • SOB (shortness of breath)      Past Surgical History:   Procedure Laterality Date   • BACK SURGERY     • CARDIAC CATHETERIZATION Left 10/06/2005    ct surgery consult pending   • CIRCUMCISION     • COLONOSCOPY  02/09/2015    Adenomatous polyp hepatic  flexure, 2 hyperplastic polyps at 60 and 30 cm, Diverticulosis repeat exam in 3 years   • CORONARY ARTERY BYPASS GRAFT  04/14/2009    x4, LIMA to LAD, SVG to the intermediate branch, obtuse marginal branch, posterior anterior descending artery   • SPINAL FUSION       Social History     Socioeconomic History   • Marital status:      Spouse name: Not on file   • Number of children: Not on file   • Years of education: Not on file   • Highest education level: Not on file   Tobacco Use   • Smoking status: Never Smoker   • Smokeless tobacco: Never Used   Substance and Sexual Activity   • Alcohol use: Yes     Comment: rarely   • Drug use: No   • Sexual activity: Defer     Family History   Problem Relation Age of Onset   • Hypertension Mother    • Heart attack Father    • Liver cancer Father    • Hypertension Sister    • Hypertension Brother    • Colon cancer Neg Hx    • Colon polyps Neg Hx        Review of Systems   Constitution: Positive for weakness and malaise/fatigue. Negative for chills, diaphoresis and fever.   HENT: Negative for nosebleeds.    Eyes: Negative for visual disturbance.   Cardiovascular: Positive for dyspnea on exertion. Negative for chest pain, claudication, cyanosis, irregular heartbeat, leg swelling, near-syncope, orthopnea, palpitations, paroxysmal nocturnal dyspnea and syncope.   Respiratory: Positive for shortness of breath. Negative for cough, hemoptysis, sputum production and wheezing.    Hematologic/Lymphatic: Negative for bleeding problem.   Skin: Negative for color change and flushing.   Musculoskeletal: Negative for falls.   Gastrointestinal: Negative for bloating, abdominal pain, hematemesis, hematochezia, melena, nausea and vomiting.   Genitourinary: Negative for hematuria.   Neurological: Negative for dizziness and light-headedness.   Psychiatric/Behavioral: Negative for altered mental status.         ECG 12 Lead  Date/Time: 10/18/2019 1:38 PM  Performed by: She Mariee  DARRIAN  Authorized by: She Mariee APRN   Comparison: compared with previous ECG from 7/26/2019  Similar to previous ECG  Rhythm: sinus bradycardia               Objective:     Physical Exam   Constitutional: He is oriented to person, place, and time. He appears well-developed and well-nourished. No distress.   HENT:   Head: Normocephalic and atraumatic.   Eyes: Pupils are equal, round, and reactive to light.   Neck: Normal range of motion. Neck supple. No JVD present. No thyromegaly present.   Cardiovascular: Normal rate, regular rhythm and intact distal pulses. Exam reveals no gallop and no friction rub.   Murmur (2/6 systolic murmur ) heard.  Pulmonary/Chest: Effort normal. No respiratory distress. He has decreased breath sounds. He has no wheezes. He has no rales. He exhibits no tenderness.   Abdominal: Soft. Bowel sounds are normal. He exhibits no distension. There is no tenderness.   Musculoskeletal: Normal range of motion. He exhibits edema (BLE edema, 1- 2+, greater on the right ).   Neurological: He is alert and oriented to person, place, and time. No cranial nerve deficit.   Skin: Skin is warm and dry. He is not diaphoretic.   Psychiatric: He has a normal mood and affect. His behavior is normal.           Assessment:          Diagnosis Plan   1. Coronary artery disease involving native coronary artery of native heart, angina presence unspecified    Stable  Hx of CABG in 2006    2. Shortness of breath    See notes in HPI      3. Essential hypertension    Controlled      4. Mixed hyperlipidemia    Continue high intensity statin, LDL controlled at 58 - 6/2019 lipid panel reviewed      5. COPD  Followed by Dr. Li  On home oxygen  Likely the primary source of his dyspnea          Plan:       As noted above   Pt continues to refuse nuclear stress testing as previously ordered by Dr. Davalos to help determine if a component of his dyspnea may be ischemic in nature  8/2019 echo results reviewed with pt - normal  LVEF, grade I DD, no significant valvular abnormalities   Continue aspirin, atorvastatin, lasix, toprol xl, arb/hctz, PRN SL NTG   Follow up 6 months, sooner with symptoms or concerns - pt requests to see Dr. Davalos only

## 2019-12-10 ENCOUNTER — LAB (OUTPATIENT)
Dept: ONCOLOGY | Facility: CLINIC | Age: 79
End: 2019-12-10

## 2019-12-10 DIAGNOSIS — N18.30 STAGE 3 CHRONIC KIDNEY DISEASE (HCC): ICD-10-CM

## 2019-12-10 DIAGNOSIS — D50.9 IRON DEFICIENCY ANEMIA, UNSPECIFIED IRON DEFICIENCY ANEMIA TYPE: ICD-10-CM

## 2019-12-10 LAB
ALBUMIN SERPL-MCNC: 4.2 G/DL (ref 3.5–5.2)
ALBUMIN/GLOB SERPL: 1.2 G/DL
ALP SERPL-CCNC: 67 U/L (ref 39–117)
ALT SERPL W P-5'-P-CCNC: 10 U/L (ref 1–41)
ANION GAP SERPL CALCULATED.3IONS-SCNC: 7 MMOL/L (ref 5–15)
AST SERPL-CCNC: 16 U/L (ref 1–40)
BASOPHILS # BLD AUTO: 0.06 10*3/MM3 (ref 0–0.2)
BASOPHILS NFR BLD AUTO: 0.6 % (ref 0–1.5)
BILIRUB SERPL-MCNC: 0.3 MG/DL (ref 0.2–1.2)
BUN BLD-MCNC: 38 MG/DL (ref 8–23)
BUN/CREAT SERPL: 27.1 (ref 7–25)
CALCIUM SPEC-SCNC: 9.9 MG/DL (ref 8.6–10.5)
CHLORIDE SERPL-SCNC: 95 MMOL/L (ref 98–107)
CO2 SERPL-SCNC: 42 MMOL/L (ref 22–29)
CREAT BLD-MCNC: 1.4 MG/DL (ref 0.76–1.27)
DEPRECATED RDW RBC AUTO: 51.6 FL (ref 37–54)
EOSINOPHIL # BLD AUTO: 0.22 10*3/MM3 (ref 0–0.4)
EOSINOPHIL NFR BLD AUTO: 2.3 % (ref 0.3–6.2)
ERYTHROCYTE [DISTWIDTH] IN BLOOD BY AUTOMATED COUNT: 12.8 % (ref 12.3–15.4)
FERRITIN SERPL-MCNC: 349 NG/ML (ref 30–400)
GFR SERPL CREATININE-BSD FRML MDRD: 49 ML/MIN/1.73
GLOBULIN UR ELPH-MCNC: 3.5 GM/DL
GLUCOSE BLD-MCNC: 72 MG/DL (ref 65–99)
HCT VFR BLD AUTO: 32.7 % (ref 37.5–51)
HGB BLD-MCNC: 9.6 G/DL (ref 13–17.7)
IMM GRANULOCYTES # BLD AUTO: 0.01 10*3/MM3 (ref 0–0.05)
IMM GRANULOCYTES NFR BLD AUTO: 0.1 % (ref 0–0.5)
IRON 24H UR-MRATE: 66 MCG/DL (ref 59–158)
IRON SATN MFR SERPL: 22 % (ref 20–50)
LYMPHOCYTES # BLD AUTO: 2.35 10*3/MM3 (ref 0.7–3.1)
LYMPHOCYTES NFR BLD AUTO: 24.5 % (ref 19.6–45.3)
MCH RBC QN AUTO: 31.4 PG (ref 26.6–33)
MCHC RBC AUTO-ENTMCNC: 29.4 G/DL (ref 31.5–35.7)
MCV RBC AUTO: 106.9 FL (ref 79–97)
MONOCYTES # BLD AUTO: 0.69 10*3/MM3 (ref 0.1–0.9)
MONOCYTES NFR BLD AUTO: 7.2 % (ref 5–12)
NEUTROPHILS # BLD AUTO: 6.26 10*3/MM3 (ref 1.7–7)
NEUTROPHILS NFR BLD AUTO: 65.3 % (ref 42.7–76)
PLATELET # BLD AUTO: 197 10*3/MM3 (ref 140–450)
PMV BLD AUTO: 9.5 FL (ref 6–12)
POTASSIUM BLD-SCNC: 4.4 MMOL/L (ref 3.5–5.2)
PROT SERPL-MCNC: 7.7 G/DL (ref 6–8.5)
RBC # BLD AUTO: 3.06 10*6/MM3 (ref 4.14–5.8)
SODIUM BLD-SCNC: 144 MMOL/L (ref 136–145)
TIBC SERPL-MCNC: 297 MCG/DL (ref 298–536)
TRANSFERRIN SERPL-MCNC: 199 MG/DL (ref 200–360)
WBC NRBC COR # BLD: 9.59 10*3/MM3 (ref 3.4–10.8)

## 2019-12-10 PROCEDURE — 80053 COMPREHEN METABOLIC PANEL: CPT | Performed by: INTERNAL MEDICINE

## 2019-12-10 PROCEDURE — 83540 ASSAY OF IRON: CPT | Performed by: INTERNAL MEDICINE

## 2019-12-10 PROCEDURE — 86235 NUCLEAR ANTIGEN ANTIBODY: CPT

## 2019-12-10 PROCEDURE — 84466 ASSAY OF TRANSFERRIN: CPT | Performed by: INTERNAL MEDICINE

## 2019-12-10 PROCEDURE — 82728 ASSAY OF FERRITIN: CPT | Performed by: INTERNAL MEDICINE

## 2019-12-10 PROCEDURE — 86225 DNA ANTIBODY NATIVE: CPT

## 2019-12-10 PROCEDURE — 85025 COMPLETE CBC W/AUTO DIFF WBC: CPT | Performed by: INTERNAL MEDICINE

## 2019-12-10 PROCEDURE — 36415 COLL VENOUS BLD VENIPUNCTURE: CPT | Performed by: INTERNAL MEDICINE

## 2019-12-10 PROCEDURE — 86038 ANTINUCLEAR ANTIBODIES: CPT | Performed by: INTERNAL MEDICINE

## 2019-12-14 NOTE — PROGRESS NOTES
[facility] ONC [facility] ONCOLOGY  [address] [place] [address] [address]  [facility] [address]  [phone]    Patient Name: Kashif Milan  Encounter Date: 12/17/2019  YOB: 1940  Patient Number: [id number]    REASON FOR VISIT: Mr. Kashif Milan is a 78-year-old male who returns in followup of anemia. He last received Injectafer on 10/03/2019.   He is here with his spouse Jaclyn.     DIAGNOSTIC ABNORMALITIES:   1. Labs made available from the referring office date back to 12/28/2018. Hgb 10, Hct 33.6, .8, platelets 238,000, WBC 10.5 with a normal differential.  2. On 02/12/2019 CMP was notable for glucose 144, BUN 36, creatinine 1.44, sodium 148, CO2 of 37 (all elevated), GFR 46 mL/min, chloride 93 (each depressed), otherwise normal with calcium 9.5, total protein 7.1 and normal LFTs. CBC with Hgb 9.5, Hct 31.1, MCV 98, platelets 247,000, WBC 11.3 with a normal differential. TSH 6.7 (ref: 0.45 - 4.5).  3. On 02/13/2019 serum iron 41, Fe sat 15% (low), TIBC 277 (250 - 450). B12 of 777, folate greater than 20.  4. 03/05/2019: MADELYN (+) 1:160 with negative reflex  5. Labs, 03/12/2019: Hemoglobin 9.4, hematocrit 30.9, .3, platelets 236,000, WBC 11,000 with a normal differential. Glucose 144, BUN 42, creatinine 1.57 (each elevated), GFR 43 (depressed), bicarb greater than 40 (elevated) otherwise normal CMP with an LDH of 379 (within reference range). Serum iron 77, saturation 26%, ferritin 90, B12 of 808, folate greater than 20. MADELYN positive 1:160 with negative reflex panel.  6. Bone marrow biopsy, 03/25/2019: Pathology performed on 03/25/2019 by [facility]. Flow Cytometry: Bone marrow aspirate: No immunophenotypic evidence for abnormal myeloid maturation, an increase in blasts or a lymphoproliferative disorder. Chromosome analysis: Normal male chromosome complement observed in all cells examined. There was no evidence of a chromosome abnormality within the limits  of the technology utilized. Bone marrow morphology: Bone marrow, smears, clot, and biopsy: Hypercellular marrow with maturing trilineage hematopoiesis and megakaryocytosis. Absent storage iron in the slides examined. No diagnostic evidence of overt myelodysplasia, chronic myeloproliferative neoplasm, metastatic neoplasm, plasma cell myeloma, or lymphoma. Flow cytometry negative. Cytogenetics: 46,XY. Male karyotype.  7. Labs, 04/09/2019: Hemoglobin 8.9, hematocrit 29.8, .1, platelets 195,000, WBC 10.5 with a normal differential. Glucose 136, BUN 36, creatinine 1.38, bicarb greater than 40 (each elevated), GFR 50 (depressed) otherwise normal CMP. Serum iron 61, saturation 20%, ferritin 202.   8. 04/16/2019, SPIEP normal. No M-spike/negative MARYCRUZ. Kappa/lambda light chain ratio: Normal.    PREVIOUS INTERVENTIONS:   1. Nulecit 125 mg IV x4 days ending 03/29/2019.  2. Injectafer 750 mg IV x1, 10/3/2019  3. Procrit  as needed        Problem List Items Addressed This Visit        Genitourinary    Stage 3 chronic kidney disease (CMS/HCC) - Primary       Hematopoietic and Hemostatic    Iron deficiency anemia         No history exists.       PAST MEDICAL HISTORY:  ALLERGIES:  Allergies   Allergen Reactions   • Contrast Dye Provider Review Needed     MRI dye causes kidneys to shut down   • Metformin And Related Itching     CURRENT MEDICATIONS:  Outpatient Encounter Medications as of 12/17/2019   Medication Sig Dispense Refill   • allopurinol (ZYLOPRIM) 100 MG tablet Take 100 mg by mouth Daily.     • aspirin 81 MG EC tablet Take 81 mg by mouth Daily.     • atorvastatin (LIPITOR) 40 MG tablet Take 40 mg by mouth Daily.     • Cholecalciferol (VITAMIN D-3) 1000 UNITS capsule Take  by mouth Daily.     • fluticasone-salmeterol (ADVAIR) 250-50 MCG/DOSE DISKUS Inhale 2 (Two) Times a Day.     • furosemide (LASIX) 40 MG tablet Take 40 mg by mouth Daily.     • levothyroxine (SYNTHROID, LEVOTHROID) 100 MCG tablet Take 100 mcg by  mouth Daily.     • metoprolol succinate XL (TOPROL-XL) 100 MG 24 hr tablet Take 100 mg by mouth Daily.     • Multiple Vitamins-Minerals (MULTIVITAMIN MEN 50+ PO) Take  by mouth Daily.     • nitroglycerin (NITROSTAT) 0.4 MG SL tablet 1 under the tongue as needed for angina, may repeat q5mins for up three doses 25 tablet 1   • O2 (OXYGEN) Inhale 1 (One) Time. 2 1/2 Liters full time     • Omega-3 Fatty Acids (FISH OIL BURP-LESS) 1200 MG capsule Take  by mouth Daily.     • valsartan-hydrochlorothiazide (DIOVAN-HCT) 320-12.5 MG per tablet Take 1 tablet by mouth Daily.       Facility-Administered Encounter Medications as of 12/17/2019   Medication Dose Route Frequency Provider Last Rate Last Dose   • ferric carboxymaltose (INJECTAFER) injection solution 750 mg  750 mg Intravenous Once Aleks Tucker MD         ADULT ILLNESSES:   Leukocytosis (disorder) ( ICD-10:D72.829 ;Elevated white blood cell count, unspecified   Anemia ( ICD-10:D64.9 ;Anemia, unspecified   Anemia in chronic kidney disease (CKD) ( ICD-10:D63.1 ;Anemia in chronic kidney disease   Asthma ( ICD-10:J45.909 ;Unspecified asthma, uncomplicated   Chronic kidney disease ( Stage III; ICD-10:N18.3 ;Chronic kidney disease, stage 3 (moderate) )   Coronary artery disease ( with coronary artery bypass grafting (CABG); ICD-10:I25.10 ;Atherosclerotic heart disease of native coronary artery without angina pectoris )   Diabetes mellitus type II ( Type 2; ICD-10:E11.9 ;Type 2 diabetes mellitus without complications )   Hiatal hernia ( evaluated by surgery. Treated conservatively; ICD-10:K44.9 ;Diaphragmatic hernia without obstruction or gangrene )   History of acute renal failure (situation) ( on short-term dialysis due to IVP dye; ICD-10:Z87.448 ;Personal history of other diseases of urinary system )   History of coronary artery bypass grafting (situation)   History of tuberculosis ( of the spine when 14 years old; ICD-10:Z86.11 ;Personal history of tuberculosis )    Hyperlipidemia ( ICD-10:E78.5 ;Hyperlipidemia, unspecified   Hypothyroidism ( on replacement; ICD-10:E03.9 ;Hypothyroidism, unspecified )   Iron deficiency anemia (disorder) ( ICD-10:D50.9 ;Iron deficiency anemia, unspecified   Macrocytic anemia ( ICD-10:D53.9 ;Nutritional anemia, unspecified   Macrocytosis ( ICD-10:D75.89 ;Other specified diseases of blood and blood-forming organs   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 3 polyps resected. Diverticulosis in sigmoid colon. Repeat 3 years not done in 2018 declined by Dr. Mcnair due to O2 use   Placement of stent in coronary artery   Operative procedure on spinal structure: Spinal fusion as a child for tuberculosis (TB) of the spine, 1955   Coronary artery bypass grafting, (CABG) x4, 2006   Surgical construction of arteriovenous shunt for dialysis (AV) shunt   Cataract removal   Cardiac catheterization      ADULT ILLNESSES:  Patient Active Problem List   Diagnosis Code   • SOB (shortness of breath) R06.02   • CAD in native artery I25.10   • Essential hypertension I10   • Hyperlipemia E78.5   • Hx of adenomatous colonic polyps Z86.010   • Class 1 obesity due to excess calories with serious comorbidity and body mass index (BMI) of 34.0 to 34.9 in adult E66.09, Z68.34   • Coronary artery disease involving native coronary artery of native heart without angina pectoris I25.10   • On home oxygen therapy Z99.81   • History of COPD Z87.09   • Iron deficiency anemia D50.9   • Stage 3 chronic kidney disease (CMS/HCC) N18.3     SURGERIES:  Past Surgical History:   Procedure Laterality Date   • BACK SURGERY     • CARDIAC CATHETERIZATION Left 10/06/2005    ct surgery consult  "pending   • CIRCUMCISION     • COLONOSCOPY  02/09/2015    Adenomatous polyp hepatic flexure, 2 hyperplastic polyps at 60 and 30 cm, Diverticulosis repeat exam in 3 years   • CORONARY ARTERY BYPASS GRAFT  04/14/2009    x4, LIMA to LAD, SVG to the intermediate branch, obtuse marginal branch, posterior anterior descending artery   • SPINAL FUSION       HEALTH MAINTENANCE ITEMS:  Health Maintenance Due   Topic Date Due   • URINE MICROALBUMIN  1940   • TDAP/TD VACCINES (1 - Tdap) 07/14/1950   • ZOSTER VACCINE (1 of 2) 07/14/1989   • PNEUMOCOCCAL VACCINE (65+ HIGH RISK) (1 of 2 - PCV13) 07/14/2004   • MEDICARE ANNUAL WELLNESS  05/08/2017   • HEMOGLOBIN A1C  05/08/2017   • DIABETIC EYE EXAM  05/08/2017   • LIPID PANEL  05/11/2017   • INFLUENZA VACCINE  08/01/2019       <no information>  Last Completed Colonoscopy     Patient has no health maintenance due at this time          There is no immunization history on file for this patient.  Last Completed Mammogram     Patient has no health maintenance due at this time            FAMILY HISTORY:  Family History   Problem Relation Age of Onset   • Hypertension Mother    • Heart attack Father    • Liver cancer Father    • Hypertension Sister    • Hypertension Brother    • Colon cancer Neg Hx    • Colon polyps Neg Hx      SOCIAL HISTORY:  Social History     Socioeconomic History   • Marital status:      Spouse name: Not on file   • Number of children: Not on file   • Years of education: Not on file   • Highest education level: Not on file   Tobacco Use   • Smoking status: Never Smoker   • Smokeless tobacco: Never Used   Substance and Sexual Activity   • Alcohol use: Yes     Comment: rarely   • Drug use: No   • Sexual activity: Defer       REVIEW OF SYSTEMS:  Constitutional:   The patient's appetite is good but energy is chronically low. He manages his personal ADLs but not much else. The breathlessness limits his activities. \"Yes.\" He has regained 5 pounds (had lost 13 " "pounds at his 2 prior visits) since his last visit. \"Heavy clothes.\" He has no fevers, chills, or drenching night sweats. His sleep habits seem appropriate.  Ear/Nose/Throat/Mouth:   He reports no ear pains, sinus symptoms, sore throat, or sore tongue. His nasal passages are dry (due to O2) which sometimes causes nose bleeding.  He has no headaches. He denies any hoarseness, change in voice quality, or hemoptysis.   Ocular:   He reports no eye pain, significant change in visual acuity, double vision, or blurry vision.  Respiratory:   He reports baseline exertional dyspnea and is short of breath with his routine activities. Has COPD from second hand smoke and is on round the clock O2. He has no chronic cough, significant shortness of breathing at rest, phlegm production, or unexplained chest wall pain.  Cardiovascular:   He reports no exertional chest pain, chest pressure, or chest heaviness. He reports no claudication. He reports no palpitations or symptomatic orthostasis.  Gastrointestinal:   He reports no dysphagia, nausea, vomiting, postprandial abdominal pain, bloating, cramping, change in bowel habits, or discoloration of the stool. He reports no rectal bleeding. He reports no constipation or diarrhea. Last colonoscopy 02/2015. Repeat due in 2018 but not done due to his COPD. Says stool cards submitted Dr. Negrete were negative x3.  Genitourinary:   He reports no urinary burning, frequency, dribbling, or discoloration. He reports no difficulty controlling his bladder. He has no need to urinate frequently through the night.   Musculoskeletal:   He reports no unexplained arthralgias, myalgias, or nighttime leg cramping.  Extremities:   He reports no trouble with fluid retention or significant leg swelling.  Endocrine:   He reports no problems with excess thirst, excessive urination, vasomotor instability.  Heme/Lymphatic:   He reports easy bruising but no unexplained bleeding, petechial rashes, or swollen " "glands.  Skin:   He reports no itching, rashes, or lesions which won't heal.  Neuro:   He reports no loss of consciousness, seizures, fainting spells, or dizziness. He reports no weakness of face, arms, or legs. He has no difficulty with speech. He has no tremors or paresthesias.  Psych:   He seems generally satisfied with life. He denies depression. He reports no mood swings.      VITAL SIGNS: /74   Pulse 58   Temp 96.6 °F (35.9 °C)   Resp 16   Ht 182.9 cm (72\")   Wt 106 kg (234 lb)   SpO2 92%   BMI 31.74 kg/m² Body surface area is 2.28 meters squared.  Pain Score    12/17/19 1127   PainSc: 0-No pain         PHYSICAL EXAMINATION:   General:   He is a pleasant, obese, well-developed, well-nourished, and modestly-kept male who is comfortable at rest. He arrived in the exam room in a wheelchair but is ambulatory within the exam room. He appears to be his stated age. His skin color is pale.   Head/Neck:   The patient is anicteric and atraumatic. The mouth and throat are clear. He is wearing O2 entrained via cannula tethered to a portable O2 canister. The trachea is midline. The neck is supple without evidence of jugular venous distention or cervical adenopathy.   Eyes:   The pupils are equal, round, and reactive to light. The extraocular movements are full. There is no scleral jaundice or erythema.   Chest:   The respiratory efforts are normal and unhindered. The breath sounds are distant. There are no wheezes, rhonchi, rales, or asymmetry of breath sounds.  Cardiovascular:   The patient has a regular cardiac rate and rhythm without murmurs, rubs, or gallops. The peripheral pulses are equal and full.  Abdomen:   The belly is soft and obese. There is no rebound or guarding. There is no organomegaly, mass-effect, or tenderness. Bowel sounds are active and of normal character.  Extremities:   There is no evidence of cyanosis or clubbing with trace bipedal edema.  Rheumatologic:   There is no overt evidence of " rheumatoid deformities of the hands. There is no sausaging of the fingers. There is no sign of active synovitis. The gait is normal.  Cutaneous:   There are no overt rashes, disseminated lesions, purpura, or petechiae.   Lymphatics:   There is no evidence of adenopathy in the cervical, supraclavicular, axillary, inguinal, or femoral areas. There is no splenomegaly.  Neurologic:   The patient is alert, oriented, cooperative, and pleasant. He is appropriately conversant. He ambulated into the exam room without assistance and transferred from chair to exam table unaided. There is no overt dysfunction of the motor, sensory, cerebellar systems.  Psych:   Mood and affect are appropriate for circumstance. Eye contact is appropriate. Normal judgement and decision making.         LABS    Lab Results - Last 18 Months   Lab Units 12/10/19  1057 09/24/19  1057 03/25/19  1012 03/19/19  1700   HEMOGLOBIN g/dL 9.6* 8.6* 8.8* 9.0*   HEMATOCRIT % 32.7* 29.6* 29.5* 31.7*   MCV fL 106.9* 105.7* 100.3* 105.0*   WBC 10*3/mm3 9.59 10.21 11.05* 11.49*   RDW % 12.8 13.8 14.0 14.0   MPV fL 9.5 9.8 10.9 11.8   PLATELETS 10*3/mm3 197 221 232 238   IMM GRAN % % 0.1 0.2 0.3 0.3   NEUTROS ABS 10*3/mm3 6.26 7.05* 7.77 7.72   LYMPHS ABS 10*3/mm3 2.35 2.26 2.15 2.68   MONOS ABS 10*3/mm3 0.69 0.64 0.88 0.81   EOS ABS 10*3/mm3 0.22 0.19 0.18 0.20   BASOS ABS 10*3/mm3 0.06 0.05 0.04 0.04   IMMATURE GRANS (ABS) 10*3/mm3 0.01 0.02 0.03 0.04   NRBC /100 WBC  --   --  0.0 0.0       Lab Results - Last 18 Months   Lab Units 12/10/19  1057 09/24/19  1057 07/02/19  1600 05/07/19  1700 04/16/19  1700 04/09/19 1700 03/05/19  1600   GLUCOSE mg/dL 72 172* 134* 102*  --  136* 144*   SODIUM mmol/L 144 143 141 142  --  144 142   POTASSIUM mmol/L 4.4 4.7 4.3 5.0  --  5.2 5.2   CO2 mmol/L 42.0* 42.0* >40.0* >40.0*  --  >40.0* >40.0*   CHLORIDE mmol/L 95* 93* 92* 91*  --  89* 88*   ANION GAP mmol/L 7.0 8.0  --   --   --   --   --    CREATININE mg/dL 1.40* 1.25 1.16  1.27  --  1.38 1.57*   BUN mg/dL 38* 34* 29* 33*  --  36* 42*   BUN / CREAT RATIO  27.1* 27.2* 25.0 26.0*  --  26.1* 26.8*   CALCIUM mg/dL 9.9 9.4 9.1 9.1  --  9.5 9.6   EGFR IF NONAFRICN AM mL/min/1.73 49* 56* 61 55*  --  50* 43*   ALK PHOS U/L 67 76 82 75  --  84 77   TOTAL PROTEIN g/dL 7.7 7.5 7.1 7.0  --  7.1 7.1   ALT (SGPT) U/L 10 11 21 17  --  15 20   AST (SGOT) U/L 16 17 20 24  --  21 22   BILIRUBIN mg/dL 0.3 0.3 0.5 0.4  --  0.7 0.5   ALBUMIN g/dL 4.20 3.80 4.00 3.80 3.3 3.70 3.80   GLOBULIN gm/dL 3.5 3.7 3.1 3.2  --  3.4 3.3       Lab Results - Last 18 Months   Lab Units 04/16/19  1700 03/05/19  1600   M-SPIKE g/dL Not Observed  --    KAPPA/LAMBDA RATIO, S  1.50  --    FREE LAMBDA LIGHT CHAINS mg/L 43.7*  --    IG KAPPA FREE LIGHT CHAIN mg/L 65.6*  --    LDH U/L  --  379       Lab Results - Last 18 Months   Lab Units 12/10/19  1057 09/24/19  1057 07/02/19  1600 05/07/19  1700 04/09/19  1700 03/05/19  1600   IRON mcg/dL 66 56* 71 52 61 77   TIBC mcg/dL 297* 292* 283 292 301 300   IRON SATURATION % 22 19* 25 18* 20 26   FERRITIN ng/mL 349.00 199.00 109.00 92.70 202.00 90.30   TSH mIU/mL  --   --   --   --   --  3.810   FOLATE ng/mL  --   --   --   --   --  >20.00       ASSESSMENT:   1. Normocytic/macrocytic anemia. Hgb 9.6; .9, 12/10/2019 (prior: Hgb 8.6 -10; MCV 98 - 105.7).   a. Contribution from chronic kidney disease, hypothyroidism, iron underutilization/anemia of chronic disease and (now repleted) iron deficiency.   b. Bone marrow biopsy, 03/25/2019 (above). Hypercellular marrow with maturing trilineage hematopoiesis and megakaryocytosis. Absent storage iron in the slides examined. No diagnostic evidence of overt myelodysplasia, chronic myeloproliferative neoplasm, metastatic neoplasm, plasma cell myeloma or lymphoma. Flow cytometry negative. Cytogenetics: 46,XY. Male karyotype.  2. Chronic kidney disease, Stage III. GFR 49 mL/min, 12/10/2019 (prior: 43 - 61 mL/minute). Followed by Dr. Medel.    3. MADELYN (+) 1:160. Denies arthralgias or dry mouth.  Repeat on 9/24/2019- negative.  4. Coronary artery disease with prior stents and coronary artery bypass grafting (CABG).   5. Hyperlipidemia.   6. Hypothyroidism on replacement.   7. Diabetes mellitus type 2 ( ICD-10:E11.9 ;Type 2 diabetes mellitus without complications   8. History of acute renal failure (ARF) on short term dialysis due to IVP dye.   9. History of paroxysmal atrial fibrillation (PAF) on Coumadin in the past now in normal sinus rhythm (NSR).   10. 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   11. Asthma.   12. Restrictive lung disease.   13. 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   14. Colon polyps (colonoscopy, 2015).   15. Abnormal CO2 greater than 40. Metabolic alkalosis - chronic. Dr. Negrete following.    RECOMMENDATIONS:   1.   Re: Apprise of labs from 12/10/2019. Note the stable macrocytic anemia, resolution of the leukocytosis, normal platelets, stable (depressed) GFR, chronically elevated bicarb, repleted serum iron, repleted (greater than 20%) iron saturation, low TIBC, ferritin of 349 and pending MADELYN.   2.  Previously discussed the bone marrow biopsy results, 03/25/2019 (above). No MDS. Normal trilineage hematopoiesis. Low iron, otherwise essentially normal.   3.  Previously discussed the labs 03/05/2019 with (+) MADELYN and on 04/16/2019 with normal SPIEP and normal kappa/lambda ratio.   4.  Previously discussed the rationale and potential toxicities (including the risk for increased mortality from stroke, myocardial infarction (MI), congestive heart failure (CHF), seizures, sepsis, allergic reactions) for DORY support. Questions answered. He will agree to proceed with a trial of therapy if and when it becomes indicated.   5.  Previously discussed the rationale and potential toxicities (anaphylaxis included) of IV iron discussed. Questions answered. He will agree to a trial of therapy if and when it is deemed indicated.   6.   Resume CBC weekly - check today - and Procrit 40,000 units subcutaneously if hemoglobin less than 11 and hematocrit less than 33 at [facility]  7.  Continue other currently identified medications.   8.  Continue ongoing management per primary care physician and other specialists.   9.  Advance Directive discussed, questions answered - initially discussed on 03/12/2019.   10. Return to the [place] office in 12 weeks with pre-office MADELYN, CMP, serum iron, Fe sat, ferritin, CBC and differential.    QUALITY MEASURES:   MEDICAL DECISION MAKING: Moderate Complexity   AMOUNT OF DATA: Moderate  RISK OF COMPLICATIONS: Moderate     TIME SPENT: Face-to-face time on this encounter, as defined by the American Medical Association in the 2019 Current Procedural Terminology codebook; assessment, record review, lab review, planning and education - at least 25 minutes (greater than 50% face-to-face).       cc: Cher Negrete MD (referring)         Jadiel Medel MD

## 2019-12-17 ENCOUNTER — OFFICE VISIT (OUTPATIENT)
Dept: ONCOLOGY | Facility: CLINIC | Age: 79
End: 2019-12-17

## 2019-12-17 VITALS
TEMPERATURE: 96.6 F | RESPIRATION RATE: 16 BRPM | OXYGEN SATURATION: 92 % | HEIGHT: 72 IN | SYSTOLIC BLOOD PRESSURE: 132 MMHG | HEART RATE: 58 BPM | BODY MASS INDEX: 31.69 KG/M2 | DIASTOLIC BLOOD PRESSURE: 74 MMHG | WEIGHT: 234 LBS

## 2019-12-17 DIAGNOSIS — D50.9 IRON DEFICIENCY ANEMIA, UNSPECIFIED IRON DEFICIENCY ANEMIA TYPE: ICD-10-CM

## 2019-12-17 DIAGNOSIS — N18.30 STAGE 3 CHRONIC KIDNEY DISEASE (HCC): Primary | ICD-10-CM

## 2019-12-17 PROCEDURE — 99214 OFFICE O/P EST MOD 30 MIN: CPT | Performed by: INTERNAL MEDICINE

## 2019-12-18 DIAGNOSIS — R76.8 ANA POSITIVE: Primary | ICD-10-CM

## 2019-12-19 ENCOUNTER — CLINICAL SUPPORT (OUTPATIENT)
Dept: ONCOLOGY | Facility: CLINIC | Age: 79
End: 2019-12-19

## 2019-12-19 ENCOUNTER — LAB (OUTPATIENT)
Dept: ONCOLOGY | Facility: CLINIC | Age: 79
End: 2019-12-19

## 2019-12-19 VITALS
OXYGEN SATURATION: 97 % | HEART RATE: 88 BPM | SYSTOLIC BLOOD PRESSURE: 136 MMHG | TEMPERATURE: 97.8 F | DIASTOLIC BLOOD PRESSURE: 82 MMHG | RESPIRATION RATE: 16 BRPM

## 2019-12-19 DIAGNOSIS — D50.9 IRON DEFICIENCY ANEMIA, UNSPECIFIED IRON DEFICIENCY ANEMIA TYPE: ICD-10-CM

## 2019-12-19 DIAGNOSIS — N18.30 STAGE 3 CHRONIC KIDNEY DISEASE (HCC): ICD-10-CM

## 2019-12-19 LAB
BASOPHILS # BLD AUTO: 0.03 10*3/MM3 (ref 0–0.2)
BASOPHILS NFR BLD AUTO: 0.3 % (ref 0–1.5)
DEPRECATED RDW RBC AUTO: 51.8 FL (ref 37–54)
EOSINOPHIL # BLD AUTO: 0.21 10*3/MM3 (ref 0–0.4)
EOSINOPHIL NFR BLD AUTO: 2.3 % (ref 0.3–6.2)
ERYTHROCYTE [DISTWIDTH] IN BLOOD BY AUTOMATED COUNT: 13 % (ref 12.3–15.4)
HCT VFR BLD AUTO: 32.2 % (ref 37.5–51)
HGB BLD-MCNC: 9.4 G/DL (ref 13–17.7)
IMM GRANULOCYTES # BLD AUTO: 0.01 10*3/MM3 (ref 0–0.05)
IMM GRANULOCYTES NFR BLD AUTO: 0.1 % (ref 0–0.5)
LYMPHOCYTES # BLD AUTO: 2.03 10*3/MM3 (ref 0.7–3.1)
LYMPHOCYTES NFR BLD AUTO: 22.2 % (ref 19.6–45.3)
MCH RBC QN AUTO: 31.9 PG (ref 26.6–33)
MCHC RBC AUTO-ENTMCNC: 29.2 G/DL (ref 31.5–35.7)
MCV RBC AUTO: 109.2 FL (ref 79–97)
MONOCYTES # BLD AUTO: 0.64 10*3/MM3 (ref 0.1–0.9)
MONOCYTES NFR BLD AUTO: 7 % (ref 5–12)
NEUTROPHILS # BLD AUTO: 6.23 10*3/MM3 (ref 1.7–7)
NEUTROPHILS NFR BLD AUTO: 68.1 % (ref 42.7–76)
PLATELET # BLD AUTO: 174 10*3/MM3 (ref 140–450)
PMV BLD AUTO: 9.7 FL (ref 6–12)
RBC # BLD AUTO: 2.95 10*6/MM3 (ref 4.14–5.8)
WBC NRBC COR # BLD: 9.15 10*3/MM3 (ref 3.4–10.8)

## 2019-12-19 PROCEDURE — 85025 COMPLETE CBC W/AUTO DIFF WBC: CPT | Performed by: INTERNAL MEDICINE

## 2019-12-19 PROCEDURE — 36415 COLL VENOUS BLD VENIPUNCTURE: CPT | Performed by: INTERNAL MEDICINE

## 2019-12-19 PROCEDURE — 99211 OFF/OP EST MAY X REQ PHY/QHP: CPT | Performed by: NURSE PRACTITIONER

## 2019-12-19 NOTE — PROGRESS NOTES
Patient here for lab evaluation and possible Procrit due to stage III chronic kidney disease. States that he is feeling ok today.  Gets tired with minimal exertion.  Skin w/d to touch.  Color wnl. Eating and drinking well. Patient has received Procrit in the past with last Procrit being on 05/19/2019.  Since it has been 7 months since his last Procrit, guidelines require that he follow initiation parameters.  Reviewed labs with patient and wife, Hgb 9.4 and Hct 32.2-patient does not meet guidelines to restart Procrit.  Instructed patient and wife regarding guidelines.  Patient will return in one week for same.  Instructed to call with any problems.  Patient and wife v/u.

## 2019-12-26 ENCOUNTER — CLINICAL SUPPORT (OUTPATIENT)
Dept: ONCOLOGY | Facility: CLINIC | Age: 79
End: 2019-12-26

## 2019-12-26 ENCOUNTER — LAB (OUTPATIENT)
Dept: ONCOLOGY | Facility: CLINIC | Age: 79
End: 2019-12-26

## 2019-12-26 VITALS
SYSTOLIC BLOOD PRESSURE: 112 MMHG | RESPIRATION RATE: 18 BRPM | TEMPERATURE: 98.2 F | HEART RATE: 60 BPM | DIASTOLIC BLOOD PRESSURE: 64 MMHG | OXYGEN SATURATION: 94 %

## 2019-12-26 DIAGNOSIS — D63.1 ANEMIA DUE TO STAGE 3 CHRONIC KIDNEY DISEASE (HCC): Primary | ICD-10-CM

## 2019-12-26 DIAGNOSIS — D50.9 IRON DEFICIENCY ANEMIA, UNSPECIFIED IRON DEFICIENCY ANEMIA TYPE: ICD-10-CM

## 2019-12-26 DIAGNOSIS — N18.30 STAGE 3 CHRONIC KIDNEY DISEASE (HCC): ICD-10-CM

## 2019-12-26 DIAGNOSIS — N18.30 ANEMIA DUE TO STAGE 3 CHRONIC KIDNEY DISEASE (HCC): Primary | ICD-10-CM

## 2019-12-26 LAB
BASOPHILS # BLD AUTO: 0.02 10*3/MM3 (ref 0–0.2)
BASOPHILS NFR BLD AUTO: 0.2 % (ref 0–1.5)
DEPRECATED RDW RBC AUTO: 52.1 FL (ref 37–54)
EOSINOPHIL # BLD AUTO: 0.26 10*3/MM3 (ref 0–0.4)
EOSINOPHIL NFR BLD AUTO: 2.8 % (ref 0.3–6.2)
ERYTHROCYTE [DISTWIDTH] IN BLOOD BY AUTOMATED COUNT: 13 % (ref 12.3–15.4)
HCT VFR BLD AUTO: 29.7 % (ref 37.5–51)
HGB BLD-MCNC: 8.7 G/DL (ref 13–17.7)
IMM GRANULOCYTES # BLD AUTO: 0.02 10*3/MM3 (ref 0–0.05)
IMM GRANULOCYTES NFR BLD AUTO: 0.2 % (ref 0–0.5)
LYMPHOCYTES # BLD AUTO: 2.28 10*3/MM3 (ref 0.7–3.1)
LYMPHOCYTES NFR BLD AUTO: 24.6 % (ref 19.6–45.3)
MCH RBC QN AUTO: 31.6 PG (ref 26.6–33)
MCHC RBC AUTO-ENTMCNC: 29.3 G/DL (ref 31.5–35.7)
MCV RBC AUTO: 108 FL (ref 79–97)
MONOCYTES # BLD AUTO: 0.77 10*3/MM3 (ref 0.1–0.9)
MONOCYTES NFR BLD AUTO: 8.3 % (ref 5–12)
NEUTROPHILS # BLD AUTO: 5.92 10*3/MM3 (ref 1.7–7)
NEUTROPHILS NFR BLD AUTO: 63.9 % (ref 42.7–76)
PLATELET # BLD AUTO: 187 10*3/MM3 (ref 140–450)
PMV BLD AUTO: 9.6 FL (ref 6–12)
RBC # BLD AUTO: 2.75 10*6/MM3 (ref 4.14–5.8)
WBC NRBC COR # BLD: 9.27 10*3/MM3 (ref 3.4–10.8)

## 2019-12-26 PROCEDURE — 36415 COLL VENOUS BLD VENIPUNCTURE: CPT | Performed by: INTERNAL MEDICINE

## 2019-12-26 PROCEDURE — 85025 COMPLETE CBC W/AUTO DIFF WBC: CPT | Performed by: INTERNAL MEDICINE

## 2019-12-26 PROCEDURE — 96372 THER/PROPH/DIAG INJ SC/IM: CPT | Performed by: NURSE PRACTITIONER

## 2019-12-26 NOTE — PROGRESS NOTES
Patient here for lab evaluation to initiate Retacrit due to stage III chronic kidney disease.  States that he is feeling ok today.  Gets tired with minimal exertion.  Skin w/d to touch.  Color wnl.  Eating and drinking well.  Parameters set to initiate Retacrit 40,000 units (been >7 months since last injection) for Hgb <10 and Hct <30.  Last iron saturation was normal at 22%. Reviewed labs, Hgb 8.7 and Hct 29.7-patient meets requirements to initiate Retacrit today.  Will give Retacrit 40,000 units SQ per DARRIAN Roman orders.  Instructed patient and wife on medication, dosage, frequency, and side effects.  Will return in 10 days for same.  Instructed to call with any problems.  Patient v/u.

## 2020-01-02 ENCOUNTER — CLINICAL SUPPORT (OUTPATIENT)
Dept: ONCOLOGY | Facility: CLINIC | Age: 80
End: 2020-01-02

## 2020-01-02 ENCOUNTER — LAB (OUTPATIENT)
Dept: ONCOLOGY | Facility: CLINIC | Age: 80
End: 2020-01-02

## 2020-01-02 VITALS
HEART RATE: 60 BPM | OXYGEN SATURATION: 93 % | TEMPERATURE: 97.6 F | DIASTOLIC BLOOD PRESSURE: 76 MMHG | SYSTOLIC BLOOD PRESSURE: 124 MMHG | RESPIRATION RATE: 16 BRPM

## 2020-01-02 DIAGNOSIS — D50.9 IRON DEFICIENCY ANEMIA, UNSPECIFIED IRON DEFICIENCY ANEMIA TYPE: ICD-10-CM

## 2020-01-02 DIAGNOSIS — N18.30 STAGE 3 CHRONIC KIDNEY DISEASE (HCC): ICD-10-CM

## 2020-01-02 DIAGNOSIS — D63.1 ANEMIA DUE TO STAGE 3 CHRONIC KIDNEY DISEASE (HCC): Primary | ICD-10-CM

## 2020-01-02 DIAGNOSIS — N18.30 ANEMIA DUE TO STAGE 3 CHRONIC KIDNEY DISEASE (HCC): Primary | ICD-10-CM

## 2020-01-02 LAB
BASOPHILS # BLD AUTO: 0.03 10*3/MM3 (ref 0–0.2)
BASOPHILS NFR BLD AUTO: 0.3 % (ref 0–1.5)
DEPRECATED RDW RBC AUTO: 54.4 FL (ref 37–54)
EOSINOPHIL # BLD AUTO: 0.21 10*3/MM3 (ref 0–0.4)
EOSINOPHIL NFR BLD AUTO: 2.1 % (ref 0.3–6.2)
ERYTHROCYTE [DISTWIDTH] IN BLOOD BY AUTOMATED COUNT: 13.6 % (ref 12.3–15.4)
HCT VFR BLD AUTO: 31.2 % (ref 37.5–51)
HGB BLD-MCNC: 8.9 G/DL (ref 13–17.7)
IMM GRANULOCYTES # BLD AUTO: 0.02 10*3/MM3 (ref 0–0.05)
IMM GRANULOCYTES NFR BLD AUTO: 0.2 % (ref 0–0.5)
LYMPHOCYTES # BLD AUTO: 2.34 10*3/MM3 (ref 0.7–3.1)
LYMPHOCYTES NFR BLD AUTO: 23.8 % (ref 19.6–45.3)
MCH RBC QN AUTO: 31.4 PG (ref 26.6–33)
MCHC RBC AUTO-ENTMCNC: 28.5 G/DL (ref 31.5–35.7)
MCV RBC AUTO: 110.2 FL (ref 79–97)
MONOCYTES # BLD AUTO: 0.78 10*3/MM3 (ref 0.1–0.9)
MONOCYTES NFR BLD AUTO: 7.9 % (ref 5–12)
NEUTROPHILS # BLD AUTO: 6.45 10*3/MM3 (ref 1.7–7)
NEUTROPHILS NFR BLD AUTO: 65.7 % (ref 42.7–76)
PLATELET # BLD AUTO: 222 10*3/MM3 (ref 140–450)
PMV BLD AUTO: 9.4 FL (ref 6–12)
RBC # BLD AUTO: 2.83 10*6/MM3 (ref 4.14–5.8)
WBC NRBC COR # BLD: 9.83 10*3/MM3 (ref 3.4–10.8)

## 2020-01-02 PROCEDURE — 85025 COMPLETE CBC W/AUTO DIFF WBC: CPT | Performed by: INTERNAL MEDICINE

## 2020-01-02 PROCEDURE — 36415 COLL VENOUS BLD VENIPUNCTURE: CPT | Performed by: INTERNAL MEDICINE

## 2020-01-02 PROCEDURE — 96372 THER/PROPH/DIAG INJ SC/IM: CPT | Performed by: NURSE PRACTITIONER

## 2020-01-02 NOTE — PROGRESS NOTES
Patient here for lab evaluation and possible Retacrit due to stage III chronic kidney disease.  Patient states that he is feeling ok today.  Gets tired with minimal exertion.  Skin w/d to touch.  Color wnl.  Eating and drinking well.  Parameters set for Retacrit 40,000 units for Hgb <11 and Hct <33.  Last Retacrit was on 12/26/19-patient states that he tolerated it well without side effects.  Reviewed labs with patient and wife, Hgb 8.9 and Hct 31.2 which is up from 8.7 and 29.7 on 12/26/19.  Will give Retacrit 40,000 units SQ today per DARRIAN Roman orders.  Will return in one week for same.  Instructed to call with any problems.  Patient v/u.

## 2020-01-09 ENCOUNTER — LAB (OUTPATIENT)
Dept: ONCOLOGY | Facility: CLINIC | Age: 80
End: 2020-01-09

## 2020-01-09 ENCOUNTER — CLINICAL SUPPORT (OUTPATIENT)
Dept: ONCOLOGY | Facility: CLINIC | Age: 80
End: 2020-01-09

## 2020-01-09 VITALS
OXYGEN SATURATION: 94 % | RESPIRATION RATE: 16 BRPM | DIASTOLIC BLOOD PRESSURE: 60 MMHG | TEMPERATURE: 97.8 F | HEART RATE: 60 BPM | SYSTOLIC BLOOD PRESSURE: 100 MMHG

## 2020-01-09 DIAGNOSIS — N18.30 STAGE 3 CHRONIC KIDNEY DISEASE (HCC): ICD-10-CM

## 2020-01-09 DIAGNOSIS — D63.1 ANEMIA DUE TO STAGE 3 CHRONIC KIDNEY DISEASE (HCC): ICD-10-CM

## 2020-01-09 DIAGNOSIS — N18.30 ANEMIA DUE TO STAGE 3 CHRONIC KIDNEY DISEASE (HCC): ICD-10-CM

## 2020-01-09 LAB
BASOPHILS # BLD AUTO: 0.03 10*3/MM3 (ref 0–0.2)
BASOPHILS NFR BLD AUTO: 0.3 % (ref 0–1.5)
DEPRECATED RDW RBC AUTO: 58.1 FL (ref 37–54)
EOSINOPHIL # BLD AUTO: 0.19 10*3/MM3 (ref 0–0.4)
EOSINOPHIL NFR BLD AUTO: 1.9 % (ref 0.3–6.2)
ERYTHROCYTE [DISTWIDTH] IN BLOOD BY AUTOMATED COUNT: 14.2 % (ref 12.3–15.4)
HCT VFR BLD AUTO: 34.3 % (ref 37.5–51)
HGB BLD-MCNC: 9.6 G/DL (ref 13–17.7)
IMM GRANULOCYTES # BLD AUTO: 0.02 10*3/MM3 (ref 0–0.05)
IMM GRANULOCYTES NFR BLD AUTO: 0.2 % (ref 0–0.5)
LYMPHOCYTES # BLD AUTO: 2 10*3/MM3 (ref 0.7–3.1)
LYMPHOCYTES NFR BLD AUTO: 20.3 % (ref 19.6–45.3)
MCH RBC QN AUTO: 31.2 PG (ref 26.6–33)
MCHC RBC AUTO-ENTMCNC: 28 G/DL (ref 31.5–35.7)
MCV RBC AUTO: 111.4 FL (ref 79–97)
MONOCYTES # BLD AUTO: 0.63 10*3/MM3 (ref 0.1–0.9)
MONOCYTES NFR BLD AUTO: 6.4 % (ref 5–12)
NEUTROPHILS # BLD AUTO: 7 10*3/MM3 (ref 1.7–7)
NEUTROPHILS NFR BLD AUTO: 70.9 % (ref 42.7–76)
PLATELET # BLD AUTO: 224 10*3/MM3 (ref 140–450)
PMV BLD AUTO: 9.3 FL (ref 6–12)
RBC # BLD AUTO: 3.08 10*6/MM3 (ref 4.14–5.8)
WBC NRBC COR # BLD: 9.87 10*3/MM3 (ref 3.4–10.8)

## 2020-01-09 PROCEDURE — 85025 COMPLETE CBC W/AUTO DIFF WBC: CPT | Performed by: NURSE PRACTITIONER

## 2020-01-09 PROCEDURE — 36415 COLL VENOUS BLD VENIPUNCTURE: CPT | Performed by: NURSE PRACTITIONER

## 2020-01-09 PROCEDURE — 99211 OFF/OP EST MAY X REQ PHY/QHP: CPT | Performed by: NURSE PRACTITIONER

## 2020-01-09 NOTE — PROGRESS NOTES
Patient here for lab evaluation and possible Retacrit for stage III chronic kidney disease.  States that he is feeling ok today.  Gets tired and SOA with minimal exertion. Patient is on oxygen continuously and is followed by Dr Li.  Amauri w/d to touch.  Color wnl.  Eating and drinking well.  Parameters for Retacrit 40,000 units if Hgb <11 and Hct <33.  Last injection was on 01/02/20-patient states that he tolerated it well without side effects.  Reviewed labs with patient and wife-Hgb 9.6 and Hct 34.3.  Patient does not require an injection at this time.  Will return in one week for same.  Instructed to call with any problems. Patient and wife v/u.

## 2020-01-16 ENCOUNTER — LAB (OUTPATIENT)
Dept: ONCOLOGY | Facility: CLINIC | Age: 80
End: 2020-01-16

## 2020-01-16 ENCOUNTER — CLINICAL SUPPORT (OUTPATIENT)
Dept: ONCOLOGY | Facility: CLINIC | Age: 80
End: 2020-01-16

## 2020-01-16 VITALS
HEART RATE: 76 BPM | TEMPERATURE: 97.7 F | DIASTOLIC BLOOD PRESSURE: 58 MMHG | OXYGEN SATURATION: 91 % | SYSTOLIC BLOOD PRESSURE: 110 MMHG | RESPIRATION RATE: 20 BRPM

## 2020-01-16 DIAGNOSIS — D63.1 ANEMIA DUE TO STAGE 3 CHRONIC KIDNEY DISEASE (HCC): ICD-10-CM

## 2020-01-16 DIAGNOSIS — D50.9 IRON DEFICIENCY ANEMIA, UNSPECIFIED IRON DEFICIENCY ANEMIA TYPE: ICD-10-CM

## 2020-01-16 DIAGNOSIS — N18.30 STAGE 3 CHRONIC KIDNEY DISEASE (HCC): ICD-10-CM

## 2020-01-16 DIAGNOSIS — N18.30 ANEMIA DUE TO STAGE 3 CHRONIC KIDNEY DISEASE (HCC): ICD-10-CM

## 2020-01-16 LAB
BASOPHILS # BLD AUTO: 0.05 10*3/MM3 (ref 0–0.2)
BASOPHILS NFR BLD AUTO: 0.5 % (ref 0–1.5)
DEPRECATED RDW RBC AUTO: 55.6 FL (ref 37–54)
EOSINOPHIL # BLD AUTO: 0.26 10*3/MM3 (ref 0–0.4)
EOSINOPHIL NFR BLD AUTO: 2.5 % (ref 0.3–6.2)
ERYTHROCYTE [DISTWIDTH] IN BLOOD BY AUTOMATED COUNT: 13.7 % (ref 12.3–15.4)
HCT VFR BLD AUTO: 33.9 % (ref 37.5–51)
HGB BLD-MCNC: 9.6 G/DL (ref 13–17.7)
IMM GRANULOCYTES # BLD AUTO: 0.02 10*3/MM3 (ref 0–0.05)
IMM GRANULOCYTES NFR BLD AUTO: 0.2 % (ref 0–0.5)
LYMPHOCYTES # BLD AUTO: 2.58 10*3/MM3 (ref 0.7–3.1)
LYMPHOCYTES NFR BLD AUTO: 24.9 % (ref 19.6–45.3)
MCH RBC QN AUTO: 31 PG (ref 26.6–33)
MCHC RBC AUTO-ENTMCNC: 28.3 G/DL (ref 31.5–35.7)
MCV RBC AUTO: 109.4 FL (ref 79–97)
MONOCYTES # BLD AUTO: 0.92 10*3/MM3 (ref 0.1–0.9)
MONOCYTES NFR BLD AUTO: 8.9 % (ref 5–12)
NEUTROPHILS # BLD AUTO: 6.53 10*3/MM3 (ref 1.7–7)
NEUTROPHILS NFR BLD AUTO: 63 % (ref 42.7–76)
PLATELET # BLD AUTO: 197 10*3/MM3 (ref 140–450)
PMV BLD AUTO: 9.7 FL (ref 6–12)
RBC # BLD AUTO: 3.1 10*6/MM3 (ref 4.14–5.8)
WBC NRBC COR # BLD: 10.36 10*3/MM3 (ref 3.4–10.8)

## 2020-01-16 PROCEDURE — 36415 COLL VENOUS BLD VENIPUNCTURE: CPT | Performed by: INTERNAL MEDICINE

## 2020-01-16 PROCEDURE — 85025 COMPLETE CBC W/AUTO DIFF WBC: CPT | Performed by: INTERNAL MEDICINE

## 2020-01-16 PROCEDURE — 99211 OFF/OP EST MAY X REQ PHY/QHP: CPT | Performed by: NURSE PRACTITIONER

## 2020-01-16 NOTE — PROGRESS NOTES
Patient here for lab evaluation and possible Retacrit for stage III chronic kidney disease.  States that he is feeling ok today.  Patient wears oxygen all time and states that he gets SOA with activity even with oxygen on-states that he has an appointment with Dr Guillermo lynn for this.  Skin w/d to touch.  Color wnl.  Eating and drinking well.  Parameters set for Retacrit 40,000 units SQ if Hgb <11 and Hct <33-last injection was on 01/02/2020-patient states he tolerated it well without side effects.  Reviewed labs with patient and wife, Hgb 9.6 and Hct 33.9-patient does not require an injection at this time.  Will return in one week for same.  Instructed to call with any problems.  Patient and wife v/u.

## 2020-01-23 ENCOUNTER — LAB (OUTPATIENT)
Dept: ONCOLOGY | Facility: CLINIC | Age: 80
End: 2020-01-23

## 2020-01-23 ENCOUNTER — CLINICAL SUPPORT (OUTPATIENT)
Dept: ONCOLOGY | Facility: CLINIC | Age: 80
End: 2020-01-23

## 2020-01-23 VITALS
DIASTOLIC BLOOD PRESSURE: 68 MMHG | HEART RATE: 64 BPM | RESPIRATION RATE: 16 BRPM | SYSTOLIC BLOOD PRESSURE: 128 MMHG | OXYGEN SATURATION: 91 % | TEMPERATURE: 97.8 F

## 2020-01-23 DIAGNOSIS — N18.30 STAGE 3 CHRONIC KIDNEY DISEASE (HCC): ICD-10-CM

## 2020-01-23 DIAGNOSIS — D63.1 ANEMIA DUE TO STAGE 3 CHRONIC KIDNEY DISEASE (HCC): ICD-10-CM

## 2020-01-23 DIAGNOSIS — N18.30 ANEMIA DUE TO STAGE 3 CHRONIC KIDNEY DISEASE (HCC): ICD-10-CM

## 2020-01-23 DIAGNOSIS — D50.9 IRON DEFICIENCY ANEMIA, UNSPECIFIED IRON DEFICIENCY ANEMIA TYPE: ICD-10-CM

## 2020-01-23 LAB
BASOPHILS # BLD AUTO: 0.04 10*3/MM3 (ref 0–0.2)
BASOPHILS NFR BLD AUTO: 0.4 % (ref 0–1.5)
DEPRECATED RDW RBC AUTO: 53 FL (ref 37–54)
EOSINOPHIL # BLD AUTO: 0.2 10*3/MM3 (ref 0–0.4)
EOSINOPHIL NFR BLD AUTO: 2.2 % (ref 0.3–6.2)
ERYTHROCYTE [DISTWIDTH] IN BLOOD BY AUTOMATED COUNT: 13.4 % (ref 12.3–15.4)
HCT VFR BLD AUTO: 33.7 % (ref 37.5–51)
HGB BLD-MCNC: 9.7 G/DL (ref 13–17.7)
IMM GRANULOCYTES # BLD AUTO: 0.01 10*3/MM3 (ref 0–0.05)
IMM GRANULOCYTES NFR BLD AUTO: 0.1 % (ref 0–0.5)
LYMPHOCYTES # BLD AUTO: 2.4 10*3/MM3 (ref 0.7–3.1)
LYMPHOCYTES NFR BLD AUTO: 26.6 % (ref 19.6–45.3)
MCH RBC QN AUTO: 31 PG (ref 26.6–33)
MCHC RBC AUTO-ENTMCNC: 28.8 G/DL (ref 31.5–35.7)
MCV RBC AUTO: 107.7 FL (ref 79–97)
MONOCYTES # BLD AUTO: 0.67 10*3/MM3 (ref 0.1–0.9)
MONOCYTES NFR BLD AUTO: 7.4 % (ref 5–12)
NEUTROPHILS # BLD AUTO: 5.7 10*3/MM3 (ref 1.7–7)
NEUTROPHILS NFR BLD AUTO: 63.3 % (ref 42.7–76)
PLATELET # BLD AUTO: 199 10*3/MM3 (ref 140–450)
PMV BLD AUTO: 9.9 FL (ref 6–12)
RBC # BLD AUTO: 3.13 10*6/MM3 (ref 4.14–5.8)
WBC NRBC COR # BLD: 9.02 10*3/MM3 (ref 3.4–10.8)

## 2020-01-23 PROCEDURE — 85025 COMPLETE CBC W/AUTO DIFF WBC: CPT | Performed by: INTERNAL MEDICINE

## 2020-01-23 PROCEDURE — 36415 COLL VENOUS BLD VENIPUNCTURE: CPT | Performed by: INTERNAL MEDICINE

## 2020-01-23 PROCEDURE — 99211 OFF/OP EST MAY X REQ PHY/QHP: CPT | Performed by: NURSE PRACTITIONER

## 2020-01-23 NOTE — PROGRESS NOTES
Patient here for lab evaluation and possible Retacrit for stage III chronic kidney disease.  States that he is feeling ok today.  Just feels tired all time due to breathing-patient has COPD and wears O2 all of the time.  Skin w/d to touch.  Color wnl.  Eating and drinking well.  Parameters set for Retacrit 40,000 units if Hgb <11 and Hct <33.  Last injection was on 01/02/20-patient states that he tolerated it well without side effects. Reviewed labs with patient and wife-Hgb 9.7 and Hct 33.7-patient does not require an injection at this time.  Will return in two weeks for same. Instructed to call with any problems.  Patient v/u.

## 2020-02-06 ENCOUNTER — CLINICAL SUPPORT (OUTPATIENT)
Dept: ONCOLOGY | Facility: CLINIC | Age: 80
End: 2020-02-06

## 2020-02-06 ENCOUNTER — LAB (OUTPATIENT)
Dept: ONCOLOGY | Facility: CLINIC | Age: 80
End: 2020-02-06

## 2020-02-06 VITALS
DIASTOLIC BLOOD PRESSURE: 72 MMHG | RESPIRATION RATE: 16 BRPM | OXYGEN SATURATION: 93 % | SYSTOLIC BLOOD PRESSURE: 118 MMHG | TEMPERATURE: 97.6 F | HEART RATE: 56 BPM

## 2020-02-06 DIAGNOSIS — N18.30 STAGE 3 CHRONIC KIDNEY DISEASE (HCC): ICD-10-CM

## 2020-02-06 DIAGNOSIS — D63.1 ANEMIA DUE TO STAGE 3 CHRONIC KIDNEY DISEASE (HCC): Primary | ICD-10-CM

## 2020-02-06 DIAGNOSIS — N18.30 ANEMIA DUE TO STAGE 3 CHRONIC KIDNEY DISEASE (HCC): Primary | ICD-10-CM

## 2020-02-06 DIAGNOSIS — D63.1 ANEMIA DUE TO STAGE 3 CHRONIC KIDNEY DISEASE (HCC): ICD-10-CM

## 2020-02-06 DIAGNOSIS — N18.30 ANEMIA DUE TO STAGE 3 CHRONIC KIDNEY DISEASE (HCC): ICD-10-CM

## 2020-02-06 LAB
BASOPHILS # BLD AUTO: 0.04 10*3/MM3 (ref 0–0.2)
BASOPHILS NFR BLD AUTO: 0.3 % (ref 0–1.5)
DEPRECATED RDW RBC AUTO: 53.3 FL (ref 37–54)
EOSINOPHIL # BLD AUTO: 0.24 10*3/MM3 (ref 0–0.4)
EOSINOPHIL NFR BLD AUTO: 2.1 % (ref 0.3–6.2)
ERYTHROCYTE [DISTWIDTH] IN BLOOD BY AUTOMATED COUNT: 13.3 % (ref 12.3–15.4)
HCT VFR BLD AUTO: 32.9 % (ref 37.5–51)
HGB BLD-MCNC: 9.4 G/DL (ref 13–17.7)
IMM GRANULOCYTES # BLD AUTO: 0.02 10*3/MM3 (ref 0–0.05)
IMM GRANULOCYTES NFR BLD AUTO: 0.2 % (ref 0–0.5)
LYMPHOCYTES # BLD AUTO: 2.19 10*3/MM3 (ref 0.7–3.1)
LYMPHOCYTES NFR BLD AUTO: 19 % (ref 19.6–45.3)
MCH RBC QN AUTO: 30.8 PG (ref 26.6–33)
MCHC RBC AUTO-ENTMCNC: 28.6 G/DL (ref 31.5–35.7)
MCV RBC AUTO: 107.9 FL (ref 79–97)
MONOCYTES # BLD AUTO: 0.81 10*3/MM3 (ref 0.1–0.9)
MONOCYTES NFR BLD AUTO: 7 % (ref 5–12)
NEUTROPHILS # BLD AUTO: 8.2 10*3/MM3 (ref 1.7–7)
NEUTROPHILS NFR BLD AUTO: 71.4 % (ref 42.7–76)
PLATELET # BLD AUTO: 202 10*3/MM3 (ref 140–450)
PMV BLD AUTO: 9.4 FL (ref 6–12)
RBC # BLD AUTO: 3.05 10*6/MM3 (ref 4.14–5.8)
WBC NRBC COR # BLD: 11.5 10*3/MM3 (ref 3.4–10.8)

## 2020-02-06 PROCEDURE — 36415 COLL VENOUS BLD VENIPUNCTURE: CPT | Performed by: NURSE PRACTITIONER

## 2020-02-06 PROCEDURE — 85025 COMPLETE CBC W/AUTO DIFF WBC: CPT | Performed by: NURSE PRACTITIONER

## 2020-02-06 PROCEDURE — 96372 THER/PROPH/DIAG INJ SC/IM: CPT | Performed by: NURSE PRACTITIONER

## 2020-02-06 NOTE — PROGRESS NOTES
Patient here for lab evaluation for possible Retacrit due to stage III chronic kidney disease.  States that he is feeling ok.  Gets tired and increased SOA with minimal exertion.  Patient wears O2 at 2L/NC continuously.  Skin w/d to touch.  Color wnl.  Eating and drinking well.  Parameters set for Retacrit 40,000 units for Hgb <11 and Hct <33.  Last injection was on 01/02/20-patient states that he tolerated it well without side effects.  Reviewed labs with patient and wife, Hgb 9.4 and Hct 32.9.  Will give Retacrit 40,000 units SQ today per DARRIAN Roman orders.  Will return in two weeks for same.  Instructed to call with any problems.  Patient v/u.

## 2020-02-20 ENCOUNTER — LAB (OUTPATIENT)
Dept: ONCOLOGY | Facility: CLINIC | Age: 80
End: 2020-02-20

## 2020-02-20 ENCOUNTER — CLINICAL SUPPORT (OUTPATIENT)
Dept: ONCOLOGY | Facility: CLINIC | Age: 80
End: 2020-02-20

## 2020-02-20 VITALS
HEART RATE: 72 BPM | SYSTOLIC BLOOD PRESSURE: 126 MMHG | OXYGEN SATURATION: 91 % | DIASTOLIC BLOOD PRESSURE: 68 MMHG | TEMPERATURE: 98.3 F | RESPIRATION RATE: 16 BRPM

## 2020-02-20 DIAGNOSIS — D50.9 IRON DEFICIENCY ANEMIA, UNSPECIFIED IRON DEFICIENCY ANEMIA TYPE: ICD-10-CM

## 2020-02-20 DIAGNOSIS — N18.30 STAGE 3 CHRONIC KIDNEY DISEASE (HCC): ICD-10-CM

## 2020-02-20 DIAGNOSIS — D63.1 ANEMIA DUE TO STAGE 3 CHRONIC KIDNEY DISEASE (HCC): Primary | ICD-10-CM

## 2020-02-20 DIAGNOSIS — N18.30 ANEMIA DUE TO STAGE 3 CHRONIC KIDNEY DISEASE (HCC): Primary | ICD-10-CM

## 2020-02-20 LAB
BASOPHILS # BLD AUTO: 0.05 10*3/MM3 (ref 0–0.2)
BASOPHILS NFR BLD AUTO: 0.5 % (ref 0–1.5)
DEPRECATED RDW RBC AUTO: 54.7 FL (ref 37–54)
EOSINOPHIL # BLD AUTO: 0.32 10*3/MM3 (ref 0–0.4)
EOSINOPHIL NFR BLD AUTO: 3.5 % (ref 0.3–6.2)
ERYTHROCYTE [DISTWIDTH] IN BLOOD BY AUTOMATED COUNT: 14 % (ref 12.3–15.4)
HCT VFR BLD AUTO: 32.2 % (ref 37.5–51)
HGB BLD-MCNC: 9.3 G/DL (ref 13–17.7)
IMM GRANULOCYTES # BLD AUTO: 0.02 10*3/MM3 (ref 0–0.05)
IMM GRANULOCYTES NFR BLD AUTO: 0.2 % (ref 0–0.5)
LYMPHOCYTES # BLD AUTO: 2.22 10*3/MM3 (ref 0.7–3.1)
LYMPHOCYTES NFR BLD AUTO: 24.2 % (ref 19.6–45.3)
MCH RBC QN AUTO: 31 PG (ref 26.6–33)
MCHC RBC AUTO-ENTMCNC: 28.9 G/DL (ref 31.5–35.7)
MCV RBC AUTO: 107.3 FL (ref 79–97)
MONOCYTES # BLD AUTO: 0.79 10*3/MM3 (ref 0.1–0.9)
MONOCYTES NFR BLD AUTO: 8.6 % (ref 5–12)
NEUTROPHILS # BLD AUTO: 5.79 10*3/MM3 (ref 1.7–7)
NEUTROPHILS NFR BLD AUTO: 63 % (ref 42.7–76)
PLATELET # BLD AUTO: 202 10*3/MM3 (ref 140–450)
PMV BLD AUTO: 9.3 FL (ref 6–12)
RBC # BLD AUTO: 3 10*6/MM3 (ref 4.14–5.8)
WBC NRBC COR # BLD: 9.19 10*3/MM3 (ref 3.4–10.8)

## 2020-02-20 PROCEDURE — 96372 THER/PROPH/DIAG INJ SC/IM: CPT | Performed by: NURSE PRACTITIONER

## 2020-02-20 PROCEDURE — 85025 COMPLETE CBC W/AUTO DIFF WBC: CPT | Performed by: INTERNAL MEDICINE

## 2020-02-20 NOTE — PROGRESS NOTES
Patient here for lab evaluation and possible Retacrit due to stage III chronic kidney disease.  States that he is feeling ok today.  Gets tired and SOA with minimal exertion.  Wears O2 at 2L/NC continuously.  Skin w/d to touch.  Color wnl.  Eating and drinking well.  Parameters set for Retacrit 40,000 unit SQ if Hgb <11 and Hct <33.  Last injection was on 02/06/20-patient states that he tolerated it well without side effects. Reviewed labs with patient and wife, Hgb 9.3 and Hct 32.2.  Will give Retacrit 40,000 units SQ today per DARRIAN Roman orders.  Will return in two weeks for same.  Instructed to call with any problems.  Patient and v/u.

## 2020-03-05 ENCOUNTER — CLINICAL SUPPORT (OUTPATIENT)
Dept: ONCOLOGY | Facility: CLINIC | Age: 80
End: 2020-03-05

## 2020-03-05 ENCOUNTER — LAB (OUTPATIENT)
Dept: ONCOLOGY | Facility: CLINIC | Age: 80
End: 2020-03-05

## 2020-03-05 VITALS
DIASTOLIC BLOOD PRESSURE: 62 MMHG | TEMPERATURE: 97 F | RESPIRATION RATE: 16 BRPM | HEART RATE: 56 BPM | OXYGEN SATURATION: 98 % | SYSTOLIC BLOOD PRESSURE: 108 MMHG

## 2020-03-05 DIAGNOSIS — N18.30 ANEMIA DUE TO STAGE 3 CHRONIC KIDNEY DISEASE (HCC): ICD-10-CM

## 2020-03-05 DIAGNOSIS — D50.9 IRON DEFICIENCY ANEMIA, UNSPECIFIED IRON DEFICIENCY ANEMIA TYPE: ICD-10-CM

## 2020-03-05 DIAGNOSIS — N18.30 STAGE 3 CHRONIC KIDNEY DISEASE (HCC): Primary | ICD-10-CM

## 2020-03-05 DIAGNOSIS — N18.30 STAGE 3 CHRONIC KIDNEY DISEASE (HCC): ICD-10-CM

## 2020-03-05 DIAGNOSIS — D63.1 ANEMIA DUE TO STAGE 3 CHRONIC KIDNEY DISEASE (HCC): ICD-10-CM

## 2020-03-05 LAB
ALBUMIN SERPL-MCNC: 3.9 G/DL (ref 3.5–5.2)
ALBUMIN/GLOB SERPL: 1.1 G/DL
ALP SERPL-CCNC: 71 U/L (ref 39–117)
ALT SERPL W P-5'-P-CCNC: 8 U/L (ref 1–41)
ANION GAP SERPL CALCULATED.3IONS-SCNC: 7 MMOL/L (ref 5–15)
AST SERPL-CCNC: 15 U/L (ref 1–40)
BASOPHILS # BLD AUTO: 0.05 10*3/MM3 (ref 0–0.2)
BASOPHILS NFR BLD AUTO: 0.6 % (ref 0–1.5)
BILIRUB SERPL-MCNC: 0.4 MG/DL (ref 0.2–1.2)
BUN BLD-MCNC: 29 MG/DL (ref 8–23)
BUN/CREAT SERPL: 20.7 (ref 7–25)
CALCIUM SPEC-SCNC: 9.3 MG/DL (ref 8.6–10.5)
CHLORIDE SERPL-SCNC: 95 MMOL/L (ref 98–107)
CO2 SERPL-SCNC: 42 MMOL/L (ref 22–29)
CREAT BLD-MCNC: 1.4 MG/DL (ref 0.76–1.27)
DEPRECATED RDW RBC AUTO: 55.3 FL (ref 37–54)
EOSINOPHIL # BLD AUTO: 0.19 10*3/MM3 (ref 0–0.4)
EOSINOPHIL NFR BLD AUTO: 2.3 % (ref 0.3–6.2)
ERYTHROCYTE [DISTWIDTH] IN BLOOD BY AUTOMATED COUNT: 13.9 % (ref 12.3–15.4)
FERRITIN SERPL-MCNC: 183.2 NG/ML (ref 30–400)
GFR SERPL CREATININE-BSD FRML MDRD: 49 ML/MIN/1.73
GLOBULIN UR ELPH-MCNC: 3.4 GM/DL
GLUCOSE BLD-MCNC: 161 MG/DL (ref 65–99)
HCT VFR BLD AUTO: 33.9 % (ref 37.5–51)
HGB BLD-MCNC: 9.7 G/DL (ref 13–17.7)
IMM GRANULOCYTES # BLD AUTO: 0.01 10*3/MM3 (ref 0–0.05)
IMM GRANULOCYTES NFR BLD AUTO: 0.1 % (ref 0–0.5)
IRON 24H UR-MRATE: 30 MCG/DL (ref 59–158)
IRON SATN MFR SERPL: 11 % (ref 20–50)
LYMPHOCYTES # BLD AUTO: 1.69 10*3/MM3 (ref 0.7–3.1)
LYMPHOCYTES NFR BLD AUTO: 20.3 % (ref 19.6–45.3)
MCH RBC QN AUTO: 30.6 PG (ref 26.6–33)
MCHC RBC AUTO-ENTMCNC: 28.6 G/DL (ref 31.5–35.7)
MCV RBC AUTO: 106.9 FL (ref 79–97)
MONOCYTES # BLD AUTO: 0.54 10*3/MM3 (ref 0.1–0.9)
MONOCYTES NFR BLD AUTO: 6.5 % (ref 5–12)
NEUTROPHILS # BLD AUTO: 5.86 10*3/MM3 (ref 1.7–7)
NEUTROPHILS NFR BLD AUTO: 70.2 % (ref 42.7–76)
PLATELET # BLD AUTO: 205 10*3/MM3 (ref 140–450)
PMV BLD AUTO: 9.6 FL (ref 6–12)
POTASSIUM BLD-SCNC: 4.6 MMOL/L (ref 3.5–5.2)
PROT SERPL-MCNC: 7.3 G/DL (ref 6–8.5)
RBC # BLD AUTO: 3.17 10*6/MM3 (ref 4.14–5.8)
SODIUM BLD-SCNC: 144 MMOL/L (ref 136–145)
TIBC SERPL-MCNC: 267 MCG/DL (ref 298–536)
TRANSFERRIN SERPL-MCNC: 179 MG/DL (ref 200–360)
WBC NRBC COR # BLD: 8.34 10*3/MM3 (ref 3.4–10.8)

## 2020-03-05 PROCEDURE — 82728 ASSAY OF FERRITIN: CPT | Performed by: INTERNAL MEDICINE

## 2020-03-05 PROCEDURE — 86235 NUCLEAR ANTIGEN ANTIBODY: CPT

## 2020-03-05 PROCEDURE — 99211 OFF/OP EST MAY X REQ PHY/QHP: CPT | Performed by: NURSE PRACTITIONER

## 2020-03-05 PROCEDURE — 86225 DNA ANTIBODY NATIVE: CPT

## 2020-03-05 PROCEDURE — 84466 ASSAY OF TRANSFERRIN: CPT | Performed by: INTERNAL MEDICINE

## 2020-03-05 PROCEDURE — 80053 COMPREHEN METABOLIC PANEL: CPT | Performed by: INTERNAL MEDICINE

## 2020-03-05 PROCEDURE — 85025 COMPLETE CBC W/AUTO DIFF WBC: CPT | Performed by: INTERNAL MEDICINE

## 2020-03-05 PROCEDURE — 86038 ANTINUCLEAR ANTIBODIES: CPT | Performed by: INTERNAL MEDICINE

## 2020-03-05 PROCEDURE — 83540 ASSAY OF IRON: CPT | Performed by: INTERNAL MEDICINE

## 2020-03-05 NOTE — PROGRESS NOTES
Patient here for lab evaluation and possible Retacrit injection due to stage III chronic kidney disease.  States that he is feeling ok today.  Gets tired and SOA with exertion.  Patient wears O2 at 2L/NC continuously.  Skin w/d to touch.  Color wnl.  Eating and drinking well.  Parameters set for Retacrit 40,000 units for Hgb <11 and Hct <33. Last injection was on 02/20/20-patient states that he tolerated it well without side effects.  Reviewed labs with patient and wife, Hgb 9.7 and Hct 33.9-patient does not require an injection at this time.  Will return in 3 weeks for same.  Instructed to call with any problems.  Patient and wife v/u.

## 2020-03-08 NOTE — PROGRESS NOTES
[facility]  [address] [place] [address] [address]  [facility] [id number]  [phone]    Patient Name: Kashif Milan  Encounter Date: 03/11/2020  YOB: 1940  Patient Number: [id number]    REASON FOR VISIT: Mr. Kashif Milan is a 79-year-old male who returns in followup of anemia from chronic kidney disease. He last received Injectafer on 10/03/2019.   He is here with his spouse Jaclyn.     DIAGNOSTIC ABNORMALITIES:   1. Labs made available from the referring office date back to 12/28/2018. Hgb 10, Hct 33.6, .8, platelets 238,000, WBC 10.5 with a normal differential.  2. On 02/12/2019 CMP was notable for glucose 144, BUN 36, creatinine 1.44, sodium 148, CO2 of 37 (all elevated), GFR 46 mL/min, chloride 93 (each depressed), otherwise normal with calcium 9.5, total protein 7.1 and normal LFTs. CBC with Hgb 9.5, Hct 31.1, MCV 98, platelets 247,000, WBC 11.3 with a normal differential. TSH 6.7 (ref: 0.45 - 4.5).  3. On 02/13/2019 serum iron 41, Fe sat 15% (low), TIBC 277 (250 - 450). B12 of 777, folate greater than 20.  4. 03/05/2019: MADELYN (+) 1:160 with negative reflex  5. Labs, 03/12/2019: Hemoglobin 9.4, hematocrit 30.9, .3, platelets 236,000, WBC 11,000 with a normal differential. Glucose 144, BUN 42, creatinine 1.57 (each elevated), GFR 43 (depressed), bicarb greater than 40 (elevated) otherwise normal CMP with an LDH of 379 (within reference range). Serum iron 77, saturation 26%, ferritin 90, B12 of 808, folate greater than 20. MADELYN positive 1:160 with negative reflex panel.  6. Bone marrow biopsy, 03/25/2019: Pathology performed on 03/25/2019 by [facility]. Flow Cytometry: Bone marrow aspirate: No immunophenotypic evidence for abnormal myeloid maturation, an increase in blasts or a lymphoproliferative disorder. Chromosome analysis: Normal male chromosome complement observed in all cells examined. There was no evidence of a chromosome  abnormality within the limits of the technology utilized. Bone marrow morphology: Bone marrow, smears, clot, and biopsy: Hypercellular marrow with maturing trilineage hematopoiesis and megakaryocytosis. Absent storage iron in the slides examined. No diagnostic evidence of overt myelodysplasia, chronic myeloproliferative neoplasm, metastatic neoplasm, plasma cell myeloma, or lymphoma. Flow cytometry negative. Cytogenetics: 46,XY. Male karyotype.  7. Labs, 04/09/2019: Hemoglobin 8.9, hematocrit 29.8, .1, platelets 195,000, WBC 10.5 with a normal differential. Glucose 136, BUN 36, creatinine 1.38, bicarb greater than 40 (each elevated), GFR 50 (depressed) otherwise normal CMP. Serum iron 61, saturation 20%, ferritin 202.   8. 04/16/2019, SPIEP normal. No M-spike/negative MARYCRUZ. Kappa/lambda light chain ratio: Normal.    PREVIOUS INTERVENTIONS:   1. Nulecit 125 mg IV x4 days ending 03/29/2019.  2. Injectafer 750 mg IV x1, 10/3/2019  3. Procrit  as needed        Problem List Items Addressed This Visit        Genitourinary    Stage 3 chronic kidney disease (CMS/HCC) - Primary    Anemia due to stage 3 chronic kidney disease (CMS/HCC)    Relevant Medications    Ferrous Sulfate (IRON PO)       Hematopoietic and Hemostatic    Iron deficiency anemia    Relevant Medications    Ferrous Sulfate (IRON PO)         No history exists.       PAST MEDICAL HISTORY:  ALLERGIES:  Allergies   Allergen Reactions   • Contrast Dye Provider Review Needed     MRI dye causes kidneys to shut down   • Metformin And Related Itching     CURRENT MEDICATIONS:  Outpatient Encounter Medications as of 3/11/2020   Medication Sig Dispense Refill   • allopurinol (ZYLOPRIM) 100 MG tablet Take 100 mg by mouth Daily.     • aspirin 81 MG EC tablet Take 81 mg by mouth Daily.     • atorvastatin (LIPITOR) 40 MG tablet Take 40 mg by mouth Daily.     • Cholecalciferol (VITAMIN D-3) 1000 UNITS capsule Take  by mouth Daily.     • Ferrous Sulfate (IRON PO) Take   by mouth.     • fluticasone-salmeterol (ADVAIR) 250-50 MCG/DOSE DISKUS Inhale 2 (Two) Times a Day.     • furosemide (LASIX) 40 MG tablet Take 40 mg by mouth Daily.     • levothyroxine (SYNTHROID, LEVOTHROID) 100 MCG tablet Take 100 mcg by mouth Daily.     • metoprolol succinate XL (TOPROL-XL) 100 MG 24 hr tablet Take 100 mg by mouth Daily.     • Multiple Vitamins-Minerals (MULTIVITAMIN MEN 50+ PO) Take  by mouth Daily.     • nitroglycerin (NITROSTAT) 0.4 MG SL tablet 1 under the tongue as needed for angina, may repeat q5mins for up three doses 25 tablet 1   • O2 (OXYGEN) Inhale 1 (One) Time. 2 1/2 Liters full time     • Omega-3 Fatty Acids (FISH OIL BURP-LESS) 1200 MG capsule Take  by mouth Daily.     • valsartan-hydrochlorothiazide (DIOVAN-HCT) 320-12.5 MG per tablet Take 1 tablet by mouth Daily.       Facility-Administered Encounter Medications as of 3/11/2020   Medication Dose Route Frequency Provider Last Rate Last Dose   • ferric carboxymaltose (INJECTAFER) injection solution 750 mg  750 mg Intravenous Once Aleks Tucker MD         ADULT ILLNESSES:   Leukocytosis (disorder) ( ICD-10:D72.829 ;Elevated white blood cell count, unspecified   Anemia ( ICD-10:D64.9 ;Anemia, unspecified   Anemia in chronic kidney disease (CKD) ( ICD-10:D63.1 ;Anemia in chronic kidney disease   Asthma ( ICD-10:J45.909 ;Unspecified asthma, uncomplicated   Chronic kidney disease ( Stage III; ICD-10:N18.3 ;Chronic kidney disease, stage 3 (moderate) )   Coronary artery disease ( with coronary artery bypass grafting (CABG); ICD-10:I25.10 ;Atherosclerotic heart disease of native coronary artery without angina pectoris )   Diabetes mellitus type II ( Type 2; ICD-10:E11.9 ;Type 2 diabetes mellitus without complications )   Hiatal hernia ( evaluated by surgery. Treated conservatively; ICD-10:K44.9 ;Diaphragmatic hernia without obstruction or gangrene )   History of acute renal failure (situation) ( on short-term dialysis due to IVP dye;  ICD-10:Z87.448 ;Personal history of other diseases of urinary system )   History of coronary artery bypass grafting (situation)   History of tuberculosis ( of the spine when 14 years old; ICD-10:Z86.11 ;Personal history of tuberculosis )   Hyperlipidemia ( ICD-10:E78.5 ;Hyperlipidemia, unspecified   Hypothyroidism ( on replacement; ICD-10:E03.9 ;Hypothyroidism, unspecified )   Iron deficiency anemia (disorder) ( ICD-10:D50.9 ;Iron deficiency anemia, unspecified   Macrocytic anemia ( ICD-10:D53.9 ;Nutritional anemia, unspecified   Macrocytosis ( ICD-10:D75.89 ;Other specified diseases of blood and blood-forming organs   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 3 polyps resected. Diverticulosis in sigmoid colon. Repeat 3 years not done in 2018 declined by Dr. Mcnair due to O2 use   Placement of stent in coronary artery   Operative procedure on spinal structure: Spinal fusion as a child for tuberculosis (TB) of the spine, 1955   Coronary artery bypass grafting, (CABG) x4, 2006   Surgical construction of arteriovenous shunt for dialysis (AV) shunt   Cataract removal   Cardiac catheterization      ADULT ILLNESSES:  Patient Active Problem List   Diagnosis Code   • SOB (shortness of breath) R06.02   • CAD in native artery I25.10   • Essential hypertension I10   • Hyperlipemia E78.5   • Hx of adenomatous colonic polyps Z86.010   • Class 1 obesity due to excess calories with serious comorbidity and body mass index (BMI) of 34.0 to 34.9 in adult E66.09, Z68.34   • Coronary artery disease involving native coronary artery of native heart without angina pectoris I25.10   • On home oxygen therapy Z99.81   • History of COPD  Z87.09   • Iron deficiency anemia D50.9   • Stage 3 chronic kidney disease (CMS/HCC) N18.3   • Anemia due to stage 3 chronic kidney disease (CMS/HCC) N18.3, D63.1     SURGERIES:  Past Surgical History:   Procedure Laterality Date   • BACK SURGERY     • CARDIAC CATHETERIZATION Left 10/06/2005    ct surgery consult pending   • CIRCUMCISION     • COLONOSCOPY  02/09/2015    Adenomatous polyp hepatic flexure, 2 hyperplastic polyps at 60 and 30 cm, Diverticulosis repeat exam in 3 years   • CORONARY ARTERY BYPASS GRAFT  04/14/2009    x4, LIMA to LAD, SVG to the intermediate branch, obtuse marginal branch, posterior anterior descending artery   • SPINAL FUSION       HEALTH MAINTENANCE ITEMS:  Health Maintenance Due   Topic Date Due   • URINE MICROALBUMIN  1940   • TDAP/TD VACCINES (1 - Tdap) 07/14/1950   • ZOSTER VACCINE (2 of 3) 09/30/2013   • HEMOGLOBIN A1C  05/08/2017   • DIABETIC EYE EXAM  05/08/2017   • LIPID PANEL  05/11/2017       <no information>  Last Completed Colonoscopy     Patient has no health maintenance due at this time          There is no immunization history on file for this patient.  Last Completed Mammogram     Patient has no health maintenance due at this time            FAMILY HISTORY:  Family History   Problem Relation Age of Onset   • Hypertension Mother    • Heart attack Father    • Liver cancer Father    • Hypertension Sister    • Hypertension Brother    • Colon cancer Neg Hx    • Colon polyps Neg Hx      SOCIAL HISTORY:  Social History     Socioeconomic History   • Marital status:      Spouse name: Not on file   • Number of children: Not on file   • Years of education: Not on file   • Highest education level: Not on file   Tobacco Use   • Smoking status: Never Smoker   • Smokeless tobacco: Never Used   Substance and Sexual Activity   • Alcohol use: Yes     Comment: rarely   • Drug use: No   • Sexual activity: Defer       REVIEW OF SYSTEMS:  Constitutional:   The patient's appetite is  "good but energy is chronically low. He manages his personal ADLs but not much else. The breathlessness limits his activities. \"Yep.\" He has lost 4 pounds (had gained 5 pounds at his prior visit) since his last visit. He has no fevers, chills, or drenching night sweats. His sleep habits seem appropriate.  Ear/Nose/Throat/Mouth:   He reports no ear pains, sinus symptoms, sore throat, or sore tongue. His nasal passages are intermittently dry (due to O2) which sometimes causes nose bleeding.  He has no headaches. He denies any hoarseness, change in voice quality, or hemoptysis.   Ocular:   He reports no eye pain, significant change in visual acuity, double vision, or blurry vision.  Respiratory:   He reports baseline exertional dyspnea and is short of breath with his routine activities. Has COPD from second hand smoke and is on round the clock O2. He has no chronic cough, significant shortness of breathing at rest, phlegm production, or unexplained chest wall pain.  Cardiovascular:   He reports no exertional chest pain, chest pressure, or chest heaviness. He reports no claudication. He reports no palpitations or symptomatic orthostasis.  Gastrointestinal:   He reports no dysphagia, nausea, vomiting, postprandial abdominal pain, bloating, cramping, change in bowel habits, or discoloration of the stool. He reports no rectal bleeding. He reports no constipation or diarrhea. Last colonoscopy 02/2015. Repeat due in 2018 but not done due to his COPD. Says stool cards previously submitted to Dr. Negrete were negative x3.  Genitourinary:   He reports no urinary burning, frequency, dribbling, or discoloration. He reports no difficulty controlling his bladder. He has no need to urinate frequently through the night.   Musculoskeletal:   He reports no unexplained arthralgias, myalgias, or nighttime leg cramping.  Extremities:   He reports no trouble with fluid retention or significant leg swelling.  Endocrine:   He reports no " "problems with excess thirst, excessive urination, vasomotor instability.  Heme/Lymphatic:   He reports easy bruising but no unexplained bleeding, petechial rashes, or swollen glands.  Skin:   He reports no itching, rashes, or lesions which won't heal.  Neuro:   He reports no loss of consciousness, seizures, fainting spells, or dizziness. He reports no weakness of face, arms, or legs. He has no difficulty with speech. He has no tremors or paresthesias.  Psych:   He seems generally satisfied with life. He denies depression. He reports no mood swings.      VITAL SIGNS: /58   Pulse 61   Temp 95.2 °F (35.1 °C)   Resp 16   Ht 182.9 cm (72\")   Wt 104 kg (230 lb)   SpO2 90%   BMI 31.19 kg/m² Body surface area is 2.26 meters squared.  Pain Score    03/11/20 1033   PainSc: 0-No pain         PHYSICAL EXAMINATION:   General:   He is a pleasant, obese, well-developed, well-nourished, and modestly-kept male who is comfortable at rest. He arrived in the exam room in a wheelchair but is ambulatory within the exam room. He appears to be his stated age. His skin color is pale.   Head/Neck:   The patient is anicteric and atraumatic. The mouth and throat are clear. He is wearing O2 entrained via cannula tethered to a portable O2 canister. The trachea is midline. The neck is supple without evidence of jugular venous distention or cervical adenopathy.   Eyes:   The pupils are equal, round, and reactive to light. The extraocular movements are full. There is no scleral jaundice or erythema.   Chest:   The respiratory efforts are normal and unhindered. The breath sounds are distant. There are no wheezes, rhonchi, rales, or asymmetry of breath sounds.  Cardiovascular:   The patient has a regular cardiac rate and rhythm without murmurs, rubs, or gallops. The peripheral pulses are equal and full.  Abdomen:   The belly is soft and obese. There is no rebound or guarding. There is no organomegaly, mass-effect, or tenderness. Bowel " sounds are active and of normal character.  Extremities:   There is no evidence of cyanosis or clubbing with trace bipedal edema.  Rheumatologic:   There is no overt evidence of rheumatoid deformities of the hands. There is no sausaging of the fingers. There is no sign of active synovitis. The gait is normal.  Cutaneous:   There are no overt rashes, disseminated lesions, purpura, or petechiae.   Lymphatics:   There is no evidence of adenopathy in the cervical, supraclavicular, nor axillary.  Neurologic:   The patient is alert, oriented, cooperative, and pleasant. He is appropriately conversant. He ambulated into the exam room without assistance and transferred from chair to exam table unaided. There is no overt dysfunction of the motor, sensory, cerebellar systems.  Psych:   Mood and affect are appropriate for circumstance. Eye contact is appropriate. Normal judgement and decision making.         LABS    Lab Results - Last 18 Months   Lab Units 03/05/20  1005 02/20/20  1035 02/06/20  1037 01/23/20  1006 01/16/20  0926 01/09/20  1019  03/25/19  1012 03/19/19  1700   HEMOGLOBIN g/dL 9.7* 9.3* 9.4* 9.7* 9.6* 9.6*   < > 8.8* 9.0*   HEMATOCRIT % 33.9* 32.2* 32.9* 33.7* 33.9* 34.3*   < > 29.5* 31.7*   MCV fL 106.9* 107.3* 107.9* 107.7* 109.4* 111.4*   < > 100.3* 105.0*   WBC 10*3/mm3 8.34 9.19 11.50* 9.02 10.36 9.87   < > 11.05* 11.49*   RDW % 13.9 14.0 13.3 13.4 13.7 14.2   < > 14.0 14.0   MPV fL 9.6 9.3 9.4 9.9 9.7 9.3   < > 10.9 11.8   PLATELETS 10*3/mm3 205 202 202 199 197 224   < > 232 238   IMM GRAN % % 0.1 0.2 0.2 0.1 0.2 0.2   < > 0.3 0.3   NEUTROS ABS 10*3/mm3 5.86 5.79 8.20* 5.70 6.53 7.00   < > 7.77 7.72   LYMPHS ABS 10*3/mm3 1.69 2.22 2.19 2.40 2.58 2.00   < > 2.15 2.68   MONOS ABS 10*3/mm3 0.54 0.79 0.81 0.67 0.92* 0.63   < > 0.88 0.81   EOS ABS 10*3/mm3 0.19 0.32 0.24 0.20 0.26 0.19   < > 0.18 0.20   BASOS ABS 10*3/mm3 0.05 0.05 0.04 0.04 0.05 0.03   < > 0.04 0.04   IMMATURE GRANS (ABS) 10*3/mm3 0.01 0.02  0.02 0.01 0.02 0.02   < > 0.03 0.04   NRBC /100 WBC  --   --   --   --   --   --   --  0.0 0.0    < > = values in this interval not displayed.       Lab Results - Last 18 Months   Lab Units 03/05/20  1005 12/10/19  1057 09/24/19  1057 07/02/19  1600 05/07/19  1700 04/16/19  1700 04/09/19  1700 03/05/19  1600   GLUCOSE mg/dL 161* 72 172* 134* 102*  --  136* 144*   SODIUM mmol/L 144 144 143 141 142  --  144 142   POTASSIUM mmol/L 4.6 4.4 4.7 4.3 5.0  --  5.2 5.2   CO2 mmol/L 42.0* 42.0* 42.0* >40.0* >40.0*  --  >40.0* >40.0*   CHLORIDE mmol/L 95* 95* 93* 92* 91*  --  89* 88*   ANION GAP mmol/L 7.0 7.0 8.0  --   --   --   --   --    CREATININE mg/dL 1.40* 1.40* 1.25 1.16 1.27  --  1.38 1.57*   BUN mg/dL 29* 38* 34* 29* 33*  --  36* 42*   BUN / CREAT RATIO  20.7 27.1* 27.2* 25.0 26.0*  --  26.1* 26.8*   CALCIUM mg/dL 9.3 9.9 9.4 9.1 9.1  --  9.5 9.6   EGFR IF NONAFRICN AM mL/min/1.73 49* 49* 56* 61 55*  --  50* 43*   ALK PHOS U/L 71 67 76 82 75  --  84 77   TOTAL PROTEIN g/dL 7.3 7.7 7.5 7.1 7.0  --  7.1 7.1   ALT (SGPT) U/L 8 10 11 21 17  --  15 20   AST (SGOT) U/L 15 16 17 20 24  --  21 22   BILIRUBIN mg/dL 0.4 0.3 0.3 0.5 0.4  --  0.7 0.5   ALBUMIN g/dL 3.90 4.20 3.80 4.00 3.80 3.3 3.70 3.80   GLOBULIN gm/dL 3.4 3.5 3.7 3.1 3.2  --  3.4 3.3       Lab Results - Last 18 Months   Lab Units 04/16/19  1700 03/05/19  1600   M-SPIKE g/dL Not Observed  --    KAPPA/LAMBDA RATIO, S  1.50  --    FREE LAMBDA LIGHT CHAINS mg/L 43.7*  --    IG KAPPA FREE LIGHT CHAIN mg/L 65.6*  --    LDH U/L  --  379       Lab Results - Last 18 Months   Lab Units 03/05/20  1005 12/10/19  1057 09/24/19  1057 07/02/19  1600 05/07/19  1700 04/09/19  1700 03/05/19  1600   IRON mcg/dL 30* 66 56* 71 52 61 77   TIBC mcg/dL 267* 297* 292* 283 292 301 300   IRON SATURATION % 11* 22 19* 25 18* 20 26   FERRITIN ng/mL 183.20 349.00 199.00 109.00 92.70 202.00 90.30   TSH mIU/mL  --   --   --   --   --   --  3.810   FOLATE ng/mL  --   --   --   --   --   --   >20.00       ASSESSMENT:   1.  Normocytic/macrocytic anemia. Hgb 9.7; .9, 03/05/2020 (prior: Hgb 8.6 -10; MCV 98 - 105.7).    a. Contribution from chronic kidney disease, hypothyroidism, iron underutilization/anemia of chronic disease and (now repleted) iron    deficiency.    b. Bone marrow biopsy, 03/25/2019 (above). Hypercellular marrow with maturing trilineage hematopoiesis and megakaryocytosis.      Absent storage iron in the slides examined. No diagnostic evidence of overt myelodysplasia, chronic myeloproliferative neoplasm, metastatic neoplasm, plasma cell myeloma or lymphoma. Flow cytometry negative. Cytogenetics: 46,XY. Male karyotype.  2.  Chronic kidney disease, Stage III. GFR 49 mL/min, 03/05/2020 (prior: 43 - 61 mL/minute). Followed by Dr. Medel.   3.  MADELYN (+) 1:160. Denies arthralgias or dry mouth.  Recurrent.    4.  Coronary artery disease with prior stents and coronary artery bypass grafting (CABG).   5.  Hyperlipidemia.   6.  Hypothyroidism on replacement.   7.  Diabetes mellitus type 2 ( ICD-10:E11.9 ;Type 2 diabetes mellitus without complications   8.  History of acute renal failure (ARF) on short term dialysis due to IVP dye.   9.  History of paroxysmal atrial fibrillation (PAF) on Coumadin in the past now in normal sinus rhythm (NSR).   10. 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   11. Asthma.   12. Restrictive lung disease.   13. 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   14. Colon polyps (colonoscopy, 2015).   15. Abnormal CO2 greater than 40. Metabolic alkalosis - chronic. Dr. Fitzgerald and Dr. Negrete following.    RECOMMENDATIONS:   1.   Re: Apprise of labs from 03/05/2020. Note the stable macrocytic anemia, resolution of the leukocytosis, normal platelets, stable (depressed) GFR, chronically elevated bicarb, depressed (30) serum iron, depressed (< 20%) iron saturation, low TIBC, but ferritin of 183 (from 349) and (+) MADELYN - 1:160.   2.  Previously discussed the  bone marrow biopsy results, 03/25/2019 (above). No MDS. Normal trilineage hematopoiesis. Low iron, otherwise essentially normal.   3.  Previously discussed the labs 03/05/2019 with (+) MADELYN and on 04/16/2019 with normal SPIEP and normal kappa/lambda ratio.   4.  Previously discussed the rationale and potential toxicities (including the risk for increased mortality from stroke, myocardial infarction (MI), congestive heart failure (CHF), seizures, sepsis, allergic reactions) for DORY support. Questions answered. He will agree to proceed with a trial of therapy if and when it becomes indicated.   5.  Previously discussed the rationale and potential toxicities (anaphylaxis included) of IV iron discussed. Questions answered. He will agree to a trial of therapy if and when it is deemed indicated.   6.  Stop CBC weekly and hold Procrit (fe sat < 20%)  7.  Schedule Injectafer 750 mg IV x 1 at [facility]  8.  Appoint to Dr. Ayala (rheumatology), Re: Persistently positive MADELYN 1:160  9.  Advance Directive discussed.   10. Return to the [place] office in 8 weeks with pre-office MADELYN, CMP, serum iron, Fe sat, ferritin, CBC and differential.    QUALITY MEASURES:   MEDICAL DECISION MAKING: Moderate Complexity   AMOUNT OF DATA: Moderate  RISK OF COMPLICATIONS: Moderate     I spent 30 total minutes, face-to-face, caring for Kashif today.  Greater than 50% of this time involved counseling and/or coordination of care as documented within this note regarding the patient's illness(es), pros and cons of various treatment options, instructions and/or risk reduction.      cc: Cher Negrete MD (referring)         MD Alex Cleary MD

## 2020-03-11 ENCOUNTER — OFFICE VISIT (OUTPATIENT)
Dept: ONCOLOGY | Facility: CLINIC | Age: 80
End: 2020-03-11

## 2020-03-11 VITALS
OXYGEN SATURATION: 90 % | HEART RATE: 61 BPM | RESPIRATION RATE: 16 BRPM | TEMPERATURE: 95.2 F | HEIGHT: 72 IN | WEIGHT: 230 LBS | SYSTOLIC BLOOD PRESSURE: 118 MMHG | BODY MASS INDEX: 31.15 KG/M2 | DIASTOLIC BLOOD PRESSURE: 58 MMHG

## 2020-03-11 DIAGNOSIS — N18.30 ANEMIA DUE TO STAGE 3 CHRONIC KIDNEY DISEASE (HCC): ICD-10-CM

## 2020-03-11 DIAGNOSIS — N18.30 STAGE 3 CHRONIC KIDNEY DISEASE (HCC): Primary | ICD-10-CM

## 2020-03-11 DIAGNOSIS — D50.9 IRON DEFICIENCY ANEMIA, UNSPECIFIED IRON DEFICIENCY ANEMIA TYPE: ICD-10-CM

## 2020-03-11 DIAGNOSIS — D63.1 ANEMIA DUE TO STAGE 3 CHRONIC KIDNEY DISEASE (HCC): ICD-10-CM

## 2020-03-11 PROCEDURE — 99214 OFFICE O/P EST MOD 30 MIN: CPT | Performed by: INTERNAL MEDICINE

## 2020-03-11 RX ORDER — SODIUM CHLORIDE 9 MG/ML
250 INJECTION, SOLUTION INTRAVENOUS ONCE
Status: CANCELLED | OUTPATIENT
Start: 2020-09-02

## 2020-03-11 RX ORDER — DIPHENHYDRAMINE HYDROCHLORIDE 50 MG/ML
50 INJECTION INTRAMUSCULAR; INTRAVENOUS AS NEEDED
Status: CANCELLED | OUTPATIENT
Start: 2020-09-02

## 2020-03-11 RX ORDER — FAMOTIDINE 10 MG/ML
20 INJECTION, SOLUTION INTRAVENOUS AS NEEDED
Status: CANCELLED | OUTPATIENT
Start: 2020-09-02

## 2020-08-19 DIAGNOSIS — N18.30 STAGE 3 CHRONIC KIDNEY DISEASE (HCC): Primary | ICD-10-CM

## 2020-08-19 DIAGNOSIS — D63.1 ANEMIA DUE TO STAGE 3 CHRONIC KIDNEY DISEASE (HCC): ICD-10-CM

## 2020-08-19 DIAGNOSIS — D50.9 IRON DEFICIENCY ANEMIA, UNSPECIFIED IRON DEFICIENCY ANEMIA TYPE: ICD-10-CM

## 2020-08-19 DIAGNOSIS — R76.8 ELEVATED ANTINUCLEAR ANTIBODY (ANA) LEVEL: ICD-10-CM

## 2020-08-19 DIAGNOSIS — N18.30 ANEMIA DUE TO STAGE 3 CHRONIC KIDNEY DISEASE (HCC): ICD-10-CM

## 2020-08-20 ENCOUNTER — CLINICAL SUPPORT (OUTPATIENT)
Dept: ONCOLOGY | Facility: CLINIC | Age: 80
End: 2020-08-20

## 2020-08-20 ENCOUNTER — LAB (OUTPATIENT)
Dept: ONCOLOGY | Facility: CLINIC | Age: 80
End: 2020-08-20

## 2020-08-20 VITALS
HEART RATE: 68 BPM | TEMPERATURE: 98.6 F | DIASTOLIC BLOOD PRESSURE: 62 MMHG | RESPIRATION RATE: 18 BRPM | OXYGEN SATURATION: 97 % | SYSTOLIC BLOOD PRESSURE: 124 MMHG

## 2020-08-20 DIAGNOSIS — D50.9 IRON DEFICIENCY ANEMIA, UNSPECIFIED IRON DEFICIENCY ANEMIA TYPE: ICD-10-CM

## 2020-08-20 DIAGNOSIS — N18.30 ANEMIA DUE TO STAGE 3 CHRONIC KIDNEY DISEASE (HCC): Primary | ICD-10-CM

## 2020-08-20 DIAGNOSIS — N18.30 STAGE 3 CHRONIC KIDNEY DISEASE (HCC): ICD-10-CM

## 2020-08-20 DIAGNOSIS — D63.1 ANEMIA DUE TO STAGE 3 CHRONIC KIDNEY DISEASE (HCC): ICD-10-CM

## 2020-08-20 DIAGNOSIS — D63.1 ANEMIA DUE TO STAGE 3 CHRONIC KIDNEY DISEASE (HCC): Primary | ICD-10-CM

## 2020-08-20 DIAGNOSIS — R76.8 ELEVATED ANTINUCLEAR ANTIBODY (ANA) LEVEL: ICD-10-CM

## 2020-08-20 DIAGNOSIS — N18.30 ANEMIA DUE TO STAGE 3 CHRONIC KIDNEY DISEASE (HCC): ICD-10-CM

## 2020-08-20 LAB
ALBUMIN SERPL-MCNC: 4.1 G/DL (ref 3.5–5.2)
ALBUMIN/GLOB SERPL: 1.2 G/DL
ALP SERPL-CCNC: 73 U/L (ref 39–117)
ALT SERPL W P-5'-P-CCNC: 10 U/L (ref 1–41)
ANION GAP SERPL CALCULATED.3IONS-SCNC: 10 MMOL/L (ref 5–15)
AST SERPL-CCNC: 15 U/L (ref 1–40)
BASOPHILS # BLD AUTO: 0.04 10*3/MM3 (ref 0–0.2)
BASOPHILS NFR BLD AUTO: 0.3 % (ref 0–1.5)
BILIRUB SERPL-MCNC: 0.3 MG/DL (ref 0–1.2)
BUN SERPL-MCNC: 38 MG/DL (ref 8–23)
BUN/CREAT SERPL: 25.9 (ref 7–25)
CALCIUM SPEC-SCNC: 9.6 MG/DL (ref 8.6–10.5)
CHLORIDE SERPL-SCNC: 94 MMOL/L (ref 98–107)
CO2 SERPL-SCNC: 40 MMOL/L (ref 22–29)
CREAT SERPL-MCNC: 1.47 MG/DL (ref 0.76–1.27)
DEPRECATED RDW RBC AUTO: 50.9 FL (ref 37–54)
EOSINOPHIL # BLD AUTO: 0.18 10*3/MM3 (ref 0–0.4)
EOSINOPHIL NFR BLD AUTO: 1.5 % (ref 0.3–6.2)
ERYTHROCYTE [DISTWIDTH] IN BLOOD BY AUTOMATED COUNT: 12.7 % (ref 12.3–15.4)
FERRITIN SERPL-MCNC: 256.8 NG/ML (ref 30–400)
GFR SERPL CREATININE-BSD FRML MDRD: 46 ML/MIN/1.73
GLOBULIN UR ELPH-MCNC: 3.3 GM/DL
GLUCOSE SERPL-MCNC: 137 MG/DL (ref 65–99)
HCT VFR BLD AUTO: 29.4 % (ref 37.5–51)
HGB BLD-MCNC: 8.6 G/DL (ref 13–17.7)
IMM GRANULOCYTES # BLD AUTO: 0.05 10*3/MM3 (ref 0–0.05)
IMM GRANULOCYTES NFR BLD AUTO: 0.4 % (ref 0–0.5)
IRON 24H UR-MRATE: 32 MCG/DL (ref 59–158)
IRON SATN MFR SERPL: 11 % (ref 20–50)
LYMPHOCYTES # BLD AUTO: 2.49 10*3/MM3 (ref 0.7–3.1)
LYMPHOCYTES NFR BLD AUTO: 20.5 % (ref 19.6–45.3)
MCH RBC QN AUTO: 31.4 PG (ref 26.6–33)
MCHC RBC AUTO-ENTMCNC: 29.3 G/DL (ref 31.5–35.7)
MCV RBC AUTO: 107.3 FL (ref 79–97)
MONOCYTES # BLD AUTO: 0.86 10*3/MM3 (ref 0.1–0.9)
MONOCYTES NFR BLD AUTO: 7.1 % (ref 5–12)
NEUTROPHILS NFR BLD AUTO: 70.2 % (ref 42.7–76)
NEUTROPHILS NFR BLD AUTO: 8.53 10*3/MM3 (ref 1.7–7)
PLATELET # BLD AUTO: 209 10*3/MM3 (ref 140–450)
PMV BLD AUTO: 10.2 FL (ref 6–12)
POTASSIUM SERPL-SCNC: 4.4 MMOL/L (ref 3.5–5.2)
PROT SERPL-MCNC: 7.4 G/DL (ref 6–8.5)
RBC # BLD AUTO: 2.74 10*6/MM3 (ref 4.14–5.8)
SODIUM SERPL-SCNC: 144 MMOL/L (ref 136–145)
TIBC SERPL-MCNC: 294 MCG/DL (ref 298–536)
TRANSFERRIN SERPL-MCNC: 197 MG/DL (ref 200–360)
WBC # BLD AUTO: 12.15 10*3/MM3 (ref 3.4–10.8)

## 2020-08-20 PROCEDURE — 86038 ANTINUCLEAR ANTIBODIES: CPT | Performed by: INTERNAL MEDICINE

## 2020-08-20 PROCEDURE — 83540 ASSAY OF IRON: CPT | Performed by: INTERNAL MEDICINE

## 2020-08-20 PROCEDURE — 84466 ASSAY OF TRANSFERRIN: CPT | Performed by: INTERNAL MEDICINE

## 2020-08-20 PROCEDURE — 85025 COMPLETE CBC W/AUTO DIFF WBC: CPT | Performed by: INTERNAL MEDICINE

## 2020-08-20 PROCEDURE — 80053 COMPREHEN METABOLIC PANEL: CPT | Performed by: INTERNAL MEDICINE

## 2020-08-20 PROCEDURE — 82728 ASSAY OF FERRITIN: CPT | Performed by: INTERNAL MEDICINE

## 2020-08-20 PROCEDURE — 96372 THER/PROPH/DIAG INJ SC/IM: CPT | Performed by: NURSE PRACTITIONER

## 2020-08-20 NOTE — PROGRESS NOTES
Patient here for lab evaluation and possible Retacrit injection for anemia due to stage III chronic kidney disease.  Patient has not been to office for lab evaluation for 5 months due to the COVID virus.  States that he just stayed home and has not been out.  C/o some increasing fatigue over the past few weeks and decided that he needed to get his blood checked.  Gets tired with minimal exertion.  Wears O2 at 2L/NC continuously.  Skin w/d to touch.  Color wnl.  Patient was also due to get IV iron in March and that was all cancelled.  States that he has been taking over the counter iron and eating more iron rich foods.  Parameters set for Retacrit 40,000 units for Hgb <11 and Hct <33.  Last injection was on 02/20-patient states that he tolerated the injections well at that time.  Reviewed labs with patient, Hgb 8.5 and Hct 29.5 today.  Will give Retacrit 40,000 units SQ per DARRIAN Roman orders.  Will return in 2 weeks for same. Instructed to call with any problems.  Patient v/u.

## 2020-08-24 LAB — ANA SER QL: NEGATIVE

## 2020-08-26 ENCOUNTER — TELEPHONE (OUTPATIENT)
Dept: ONCOLOGY | Facility: CLINIC | Age: 80
End: 2020-08-26

## 2020-08-26 NOTE — TELEPHONE ENCOUNTER
Called patient regarding low iron saturation-11% and that Dr Tucker would like for him to get one dose of Injectafer.  This has been set up for 09/02 at 1330.  Appointment in [place] has been changed to 09/17 at 1045.  Patient v/u and agreeable to plan.

## 2020-09-02 ENCOUNTER — INFUSION (OUTPATIENT)
Dept: ONCOLOGY | Facility: HOSPITAL | Age: 80
End: 2020-09-02

## 2020-09-02 VITALS
RESPIRATION RATE: 18 BRPM | OXYGEN SATURATION: 100 % | TEMPERATURE: 97.5 F | DIASTOLIC BLOOD PRESSURE: 50 MMHG | HEART RATE: 62 BPM | SYSTOLIC BLOOD PRESSURE: 113 MMHG | WEIGHT: 226.2 LBS | BODY MASS INDEX: 30.68 KG/M2

## 2020-09-02 DIAGNOSIS — D50.9 IRON DEFICIENCY ANEMIA, UNSPECIFIED IRON DEFICIENCY ANEMIA TYPE: Primary | ICD-10-CM

## 2020-09-02 PROCEDURE — 96365 THER/PROPH/DIAG IV INF INIT: CPT

## 2020-09-02 PROCEDURE — 25010000002 FERRIC CARBOXYMALTOSE 750 MG/15ML SOLUTION 15 ML VIAL: Performed by: INTERNAL MEDICINE

## 2020-09-02 RX ORDER — FAMOTIDINE 10 MG/ML
20 INJECTION, SOLUTION INTRAVENOUS AS NEEDED
Status: DISCONTINUED | OUTPATIENT
Start: 2020-09-02 | End: 2020-09-02 | Stop reason: HOSPADM

## 2020-09-02 RX ORDER — DIPHENHYDRAMINE HYDROCHLORIDE 50 MG/ML
50 INJECTION INTRAMUSCULAR; INTRAVENOUS AS NEEDED
Status: DISCONTINUED | OUTPATIENT
Start: 2020-09-02 | End: 2020-09-02 | Stop reason: HOSPADM

## 2020-09-02 RX ORDER — SODIUM CHLORIDE 9 MG/ML
250 INJECTION, SOLUTION INTRAVENOUS ONCE
Status: COMPLETED | OUTPATIENT
Start: 2020-09-02 | End: 2020-09-02

## 2020-09-02 RX ADMIN — FERRIC CARBOXYMALTOSE INJECTION 750 MG: 50 INJECTION, SOLUTION INTRAVENOUS at 14:04

## 2020-09-02 RX ADMIN — SODIUM CHLORIDE 250 ML: 9 INJECTION, SOLUTION INTRAVENOUS at 14:04

## 2020-09-02 NOTE — PROGRESS NOTES
1347 Called chris to check on patients retacrit as he is scheduled to come and get it tomorrow. Chris states patient will get injectafer today and iron will be rechecked on9.17.

## 2020-09-17 ENCOUNTER — LAB (OUTPATIENT)
Dept: ONCOLOGY | Facility: CLINIC | Age: 80
End: 2020-09-17

## 2020-09-17 ENCOUNTER — CLINICAL SUPPORT (OUTPATIENT)
Dept: ONCOLOGY | Facility: CLINIC | Age: 80
End: 2020-09-17

## 2020-09-17 VITALS
RESPIRATION RATE: 18 BRPM | OXYGEN SATURATION: 100 % | HEART RATE: 64 BPM | DIASTOLIC BLOOD PRESSURE: 68 MMHG | SYSTOLIC BLOOD PRESSURE: 122 MMHG | TEMPERATURE: 98.3 F

## 2020-09-17 DIAGNOSIS — N18.30 ANEMIA DUE TO STAGE 3 CHRONIC KIDNEY DISEASE (HCC): Primary | ICD-10-CM

## 2020-09-17 DIAGNOSIS — N18.30 STAGE 3 CHRONIC KIDNEY DISEASE (HCC): ICD-10-CM

## 2020-09-17 DIAGNOSIS — N18.30 ANEMIA DUE TO STAGE 3 CHRONIC KIDNEY DISEASE (HCC): ICD-10-CM

## 2020-09-17 DIAGNOSIS — D63.1 ANEMIA DUE TO STAGE 3 CHRONIC KIDNEY DISEASE (HCC): ICD-10-CM

## 2020-09-17 DIAGNOSIS — D63.1 ANEMIA DUE TO STAGE 3 CHRONIC KIDNEY DISEASE (HCC): Primary | ICD-10-CM

## 2020-09-17 LAB
ALBUMIN SERPL-MCNC: 4.1 G/DL (ref 3.5–5.2)
ALBUMIN/GLOB SERPL: 1.3 G/DL
ALP SERPL-CCNC: 78 U/L (ref 39–117)
ALT SERPL W P-5'-P-CCNC: 10 U/L (ref 1–41)
ANION GAP SERPL CALCULATED.3IONS-SCNC: 7 MMOL/L (ref 5–15)
AST SERPL-CCNC: 17 U/L (ref 1–40)
BASOPHILS # BLD AUTO: 0.05 10*3/MM3 (ref 0–0.2)
BASOPHILS NFR BLD AUTO: 0.5 % (ref 0–1.5)
BILIRUB SERPL-MCNC: 0.3 MG/DL (ref 0–1.2)
BUN SERPL-MCNC: 28 MG/DL (ref 8–23)
BUN/CREAT SERPL: 22.2 (ref 7–25)
CALCIUM SPEC-SCNC: 9.3 MG/DL (ref 8.6–10.5)
CHLORIDE SERPL-SCNC: 97 MMOL/L (ref 98–107)
CO2 SERPL-SCNC: 40 MMOL/L (ref 22–29)
CREAT SERPL-MCNC: 1.26 MG/DL (ref 0.76–1.27)
DEPRECATED RDW RBC AUTO: 55.8 FL (ref 37–54)
EOSINOPHIL # BLD AUTO: 0.26 10*3/MM3 (ref 0–0.4)
EOSINOPHIL NFR BLD AUTO: 2.7 % (ref 0.3–6.2)
ERYTHROCYTE [DISTWIDTH] IN BLOOD BY AUTOMATED COUNT: 13.8 % (ref 12.3–15.4)
FERRITIN SERPL-MCNC: 721.1 NG/ML (ref 30–400)
GFR SERPL CREATININE-BSD FRML MDRD: 55 ML/MIN/1.73
GLOBULIN UR ELPH-MCNC: 3.2 GM/DL
GLUCOSE SERPL-MCNC: 86 MG/DL (ref 65–99)
HCT VFR BLD AUTO: 31.9 % (ref 37.5–51)
HGB BLD-MCNC: 9 G/DL (ref 13–17.7)
IMM GRANULOCYTES # BLD AUTO: 0.02 10*3/MM3 (ref 0–0.05)
IMM GRANULOCYTES NFR BLD AUTO: 0.2 % (ref 0–0.5)
IRON 24H UR-MRATE: 44 MCG/DL (ref 59–158)
IRON SATN MFR SERPL: 16 % (ref 20–50)
LYMPHOCYTES # BLD AUTO: 2.17 10*3/MM3 (ref 0.7–3.1)
LYMPHOCYTES NFR BLD AUTO: 22.7 % (ref 19.6–45.3)
MCH RBC QN AUTO: 30.7 PG (ref 26.6–33)
MCHC RBC AUTO-ENTMCNC: 28.2 G/DL (ref 31.5–35.7)
MCV RBC AUTO: 108.9 FL (ref 79–97)
MONOCYTES # BLD AUTO: 0.76 10*3/MM3 (ref 0.1–0.9)
MONOCYTES NFR BLD AUTO: 8 % (ref 5–12)
NEUTROPHILS NFR BLD AUTO: 6.28 10*3/MM3 (ref 1.7–7)
NEUTROPHILS NFR BLD AUTO: 65.9 % (ref 42.7–76)
PLATELET # BLD AUTO: 186 10*3/MM3 (ref 140–450)
PMV BLD AUTO: 9.5 FL (ref 6–12)
POTASSIUM SERPL-SCNC: 4.8 MMOL/L (ref 3.5–5.2)
PROT SERPL-MCNC: 7.3 G/DL (ref 6–8.5)
RBC # BLD AUTO: 2.93 10*6/MM3 (ref 4.14–5.8)
SODIUM SERPL-SCNC: 144 MMOL/L (ref 136–145)
TIBC SERPL-MCNC: 268 MCG/DL (ref 298–536)
TRANSFERRIN SERPL-MCNC: 180 MG/DL (ref 200–360)
WBC # BLD AUTO: 9.54 10*3/MM3 (ref 3.4–10.8)

## 2020-09-17 PROCEDURE — 84466 ASSAY OF TRANSFERRIN: CPT | Performed by: NURSE PRACTITIONER

## 2020-09-17 PROCEDURE — 82728 ASSAY OF FERRITIN: CPT | Performed by: NURSE PRACTITIONER

## 2020-09-17 PROCEDURE — 83540 ASSAY OF IRON: CPT | Performed by: NURSE PRACTITIONER

## 2020-09-17 PROCEDURE — 80053 COMPREHEN METABOLIC PANEL: CPT | Performed by: NURSE PRACTITIONER

## 2020-09-17 PROCEDURE — 96372 THER/PROPH/DIAG INJ SC/IM: CPT | Performed by: NURSE PRACTITIONER

## 2020-09-17 PROCEDURE — 85025 COMPLETE CBC W/AUTO DIFF WBC: CPT | Performed by: NURSE PRACTITIONER

## 2020-09-17 NOTE — PROGRESS NOTES
Patient here for lab evaluation and possible Retacrit for anemia due to stage III chronic kidney disease.  States that he is feeling ok today.  Gets a little tired with exertion.  Skin w/d to touch.  Color wnl.  Eating and drinking well.  Parameters set for Retacrit 40,000 units for Hgb <11 and Hct <33.  Last injection was on 08/20-states that he tolerated it well without side effects.  Received iron panel results on 08/21-iron sat 11%-patient received one dose of Injectafer on 09/02.  Reviewed labs with patient, Hgb 9.0 and Hct 31.9 today.  Will give Retacrit 40,000 units SQ today per DARRIAN Roman orders.  Will return in two weeks for same.  Instructed to call with any problems.  Patient v/u.

## 2020-09-28 DIAGNOSIS — D63.1 ANEMIA DUE TO STAGE 3 CHRONIC KIDNEY DISEASE (HCC): Primary | ICD-10-CM

## 2020-09-28 DIAGNOSIS — D50.9 IRON DEFICIENCY ANEMIA, UNSPECIFIED IRON DEFICIENCY ANEMIA TYPE: ICD-10-CM

## 2020-09-28 DIAGNOSIS — N18.30 ANEMIA DUE TO STAGE 3 CHRONIC KIDNEY DISEASE (HCC): Primary | ICD-10-CM

## 2020-10-05 DIAGNOSIS — D50.9 IRON DEFICIENCY ANEMIA, UNSPECIFIED IRON DEFICIENCY ANEMIA TYPE: Primary | ICD-10-CM

## 2020-10-05 RX ORDER — SODIUM CHLORIDE 9 MG/ML
250 INJECTION, SOLUTION INTRAVENOUS ONCE
Status: CANCELLED | OUTPATIENT
Start: 2020-10-06

## 2020-10-05 RX ORDER — DIPHENHYDRAMINE HYDROCHLORIDE 50 MG/ML
50 INJECTION INTRAMUSCULAR; INTRAVENOUS AS NEEDED
Status: CANCELLED | OUTPATIENT
Start: 2020-10-06

## 2020-10-05 RX ORDER — FAMOTIDINE 10 MG/ML
20 INJECTION, SOLUTION INTRAVENOUS AS NEEDED
Status: CANCELLED | OUTPATIENT
Start: 2020-10-06

## 2020-10-06 ENCOUNTER — INFUSION (OUTPATIENT)
Dept: ONCOLOGY | Facility: HOSPITAL | Age: 80
End: 2020-10-06

## 2020-10-06 VITALS
RESPIRATION RATE: 18 BRPM | SYSTOLIC BLOOD PRESSURE: 132 MMHG | DIASTOLIC BLOOD PRESSURE: 56 MMHG | HEART RATE: 78 BPM | TEMPERATURE: 98 F | HEIGHT: 72 IN | BODY MASS INDEX: 31.56 KG/M2 | OXYGEN SATURATION: 100 % | WEIGHT: 233 LBS

## 2020-10-06 DIAGNOSIS — D50.9 IRON DEFICIENCY ANEMIA, UNSPECIFIED IRON DEFICIENCY ANEMIA TYPE: Primary | ICD-10-CM

## 2020-10-06 PROCEDURE — 25010000002 FERRIC CARBOXYMALTOSE 750 MG/15ML SOLUTION 15 ML VIAL: Performed by: INTERNAL MEDICINE

## 2020-10-06 PROCEDURE — 96365 THER/PROPH/DIAG IV INF INIT: CPT

## 2020-10-06 RX ORDER — FAMOTIDINE 10 MG/ML
20 INJECTION, SOLUTION INTRAVENOUS AS NEEDED
Status: DISCONTINUED | OUTPATIENT
Start: 2020-10-06 | End: 2020-10-06 | Stop reason: HOSPADM

## 2020-10-06 RX ORDER — SODIUM CHLORIDE 9 MG/ML
250 INJECTION, SOLUTION INTRAVENOUS ONCE
Status: COMPLETED | OUTPATIENT
Start: 2020-10-06 | End: 2020-10-06

## 2020-10-06 RX ORDER — DIPHENHYDRAMINE HYDROCHLORIDE 50 MG/ML
50 INJECTION INTRAMUSCULAR; INTRAVENOUS AS NEEDED
Status: DISCONTINUED | OUTPATIENT
Start: 2020-10-06 | End: 2020-10-06 | Stop reason: HOSPADM

## 2020-10-06 RX ADMIN — FERRIC CARBOXYMALTOSE INJECTION 750 MG: 50 INJECTION, SOLUTION INTRAVENOUS at 14:12

## 2020-10-06 RX ADMIN — SODIUM CHLORIDE 250 ML: 9 INJECTION, SOLUTION INTRAVENOUS at 14:08

## 2020-10-22 ENCOUNTER — OFFICE VISIT (OUTPATIENT)
Dept: ONCOLOGY | Facility: CLINIC | Age: 80
End: 2020-10-22

## 2020-10-22 ENCOUNTER — LAB (OUTPATIENT)
Dept: ONCOLOGY | Facility: CLINIC | Age: 80
End: 2020-10-22

## 2020-10-22 VITALS
TEMPERATURE: 98 F | HEART RATE: 68 BPM | BODY MASS INDEX: 31.02 KG/M2 | HEIGHT: 72 IN | DIASTOLIC BLOOD PRESSURE: 72 MMHG | WEIGHT: 229 LBS | SYSTOLIC BLOOD PRESSURE: 128 MMHG | RESPIRATION RATE: 16 BRPM | OXYGEN SATURATION: 95 %

## 2020-10-22 DIAGNOSIS — D64.9 ANEMIA, UNSPECIFIED TYPE: ICD-10-CM

## 2020-10-22 DIAGNOSIS — N18.32 STAGE 3B CHRONIC KIDNEY DISEASE (HCC): Primary | ICD-10-CM

## 2020-10-22 DIAGNOSIS — D64.9 ANEMIA, UNSPECIFIED TYPE: Primary | ICD-10-CM

## 2020-10-22 DIAGNOSIS — D63.1 ANEMIA DUE TO STAGE 3B CHRONIC KIDNEY DISEASE (HCC): ICD-10-CM

## 2020-10-22 DIAGNOSIS — I10 ESSENTIAL HYPERTENSION: ICD-10-CM

## 2020-10-22 DIAGNOSIS — N18.32 ANEMIA DUE TO STAGE 3B CHRONIC KIDNEY DISEASE (HCC): ICD-10-CM

## 2020-10-22 LAB
ALBUMIN SERPL-MCNC: 4.1 G/DL (ref 3.5–5.2)
ALBUMIN/GLOB SERPL: 1.3 G/DL
ALP SERPL-CCNC: 81 U/L (ref 39–117)
ALT SERPL W P-5'-P-CCNC: 9 U/L (ref 1–41)
ANION GAP SERPL CALCULATED.3IONS-SCNC: 4 MMOL/L (ref 5–15)
AST SERPL-CCNC: 18 U/L (ref 1–40)
BASOPHILS # BLD AUTO: 0.04 10*3/MM3 (ref 0–0.2)
BASOPHILS NFR BLD AUTO: 0.4 % (ref 0–1.5)
BILIRUB SERPL-MCNC: 0.4 MG/DL (ref 0–1.2)
BUN SERPL-MCNC: 36 MG/DL (ref 8–23)
BUN/CREAT SERPL: 22 (ref 7–25)
CALCIUM SPEC-SCNC: 9.4 MG/DL (ref 8.6–10.5)
CHLORIDE SERPL-SCNC: 95 MMOL/L (ref 98–107)
CO2 SERPL-SCNC: 43 MMOL/L (ref 22–29)
CREAT SERPL-MCNC: 1.64 MG/DL (ref 0.76–1.27)
DEPRECATED RDW RBC AUTO: 55.9 FL (ref 37–54)
EOSINOPHIL # BLD AUTO: 0.25 10*3/MM3 (ref 0–0.4)
EOSINOPHIL NFR BLD AUTO: 2.6 % (ref 0.3–6.2)
ERYTHROCYTE [DISTWIDTH] IN BLOOD BY AUTOMATED COUNT: 14.1 % (ref 12.3–15.4)
FERRITIN SERPL-MCNC: 955.6 NG/ML (ref 30–400)
GFR SERPL CREATININE-BSD FRML MDRD: 41 ML/MIN/1.73
GLOBULIN UR ELPH-MCNC: 3.2 GM/DL
GLUCOSE SERPL-MCNC: 182 MG/DL (ref 65–99)
HCT VFR BLD AUTO: 34 % (ref 37.5–51)
HGB BLD-MCNC: 9.7 G/DL (ref 13–17.7)
IMM GRANULOCYTES # BLD AUTO: 0.06 10*3/MM3 (ref 0–0.05)
IMM GRANULOCYTES NFR BLD AUTO: 0.6 % (ref 0–0.5)
IRON 24H UR-MRATE: 72 MCG/DL (ref 59–158)
IRON SATN MFR SERPL: 28 % (ref 20–50)
LYMPHOCYTES # BLD AUTO: 1.91 10*3/MM3 (ref 0.7–3.1)
LYMPHOCYTES NFR BLD AUTO: 20.2 % (ref 19.6–45.3)
MCH RBC QN AUTO: 30.7 PG (ref 26.6–33)
MCHC RBC AUTO-ENTMCNC: 28.5 G/DL (ref 31.5–35.7)
MCV RBC AUTO: 107.6 FL (ref 79–97)
MONOCYTES # BLD AUTO: 0.68 10*3/MM3 (ref 0.1–0.9)
MONOCYTES NFR BLD AUTO: 7.2 % (ref 5–12)
NEUTROPHILS NFR BLD AUTO: 6.5 10*3/MM3 (ref 1.7–7)
NEUTROPHILS NFR BLD AUTO: 69 % (ref 42.7–76)
PLATELET # BLD AUTO: 186 10*3/MM3 (ref 140–450)
PMV BLD AUTO: 9.9 FL (ref 6–12)
POTASSIUM SERPL-SCNC: 4.8 MMOL/L (ref 3.5–5.2)
PROT SERPL-MCNC: 7.3 G/DL (ref 6–8.5)
RBC # BLD AUTO: 3.16 10*6/MM3 (ref 4.14–5.8)
SODIUM SERPL-SCNC: 142 MMOL/L (ref 136–145)
TIBC SERPL-MCNC: 258 MCG/DL (ref 298–536)
TRANSFERRIN SERPL-MCNC: 173 MG/DL (ref 200–360)
WBC # BLD AUTO: 9.44 10*3/MM3 (ref 3.4–10.8)

## 2020-10-22 PROCEDURE — 83540 ASSAY OF IRON: CPT | Performed by: INTERNAL MEDICINE

## 2020-10-22 PROCEDURE — 84466 ASSAY OF TRANSFERRIN: CPT | Performed by: INTERNAL MEDICINE

## 2020-10-22 PROCEDURE — 80053 COMPREHEN METABOLIC PANEL: CPT | Performed by: INTERNAL MEDICINE

## 2020-10-22 PROCEDURE — 85025 COMPLETE CBC W/AUTO DIFF WBC: CPT | Performed by: INTERNAL MEDICINE

## 2020-10-22 PROCEDURE — 82728 ASSAY OF FERRITIN: CPT | Performed by: INTERNAL MEDICINE

## 2020-10-22 PROCEDURE — 99213 OFFICE O/P EST LOW 20 MIN: CPT | Performed by: NURSE PRACTITIONER

## 2020-10-22 NOTE — PROGRESS NOTES
[facility]  HEMATOLOGY & ONCOLOGY    [facility] ONC [facility] ONCOLOGY  [address] [place] [address] [address]  [facility] [id number] [phone]    Patient Name: Kashif Milan  Encounter Date: 10/22/2020  YOB: 1940  Patient Number: [id number]    Chief Complaint   Patient presents with   • RADHA     Here for f/u   • Chronic Kidney Disease       REASON FOR VISIT: Mr. Milan is a 81 yo patient here today in follow up for anemia.  He has had a history of CKD stage III as well as iron deficiency.      He has required injectafer in the past.  The last dosing was in October 2020 for a baseline hgb of 9.0 and iron saturation of 16%.  He received procrit therapy on 9/17/2020 at 40,000.    He presents today with a hgb of 9.7, hct 34, .6, WBC 9.44 and platelets 186,000.  CMP shows a GFR of 41ml/min, otherwise stable. Iron saturation of 28% and ferritin of 955.60.      DIAGNOSTIC ABNORMALITIES:   1. Labs made available from the referring office date back to 12/28/2018. Hgb 10, Hct 33.6, .8, platelets 238,000, WBC 10.5 with a normal differential.  2. On 02/12/2019 CMP was notable for glucose 144, BUN 36, creatinine 1.44, sodium 148, CO2 of 37 (all elevated), GFR 46 mL/min, chloride 93 (each depressed), otherwise normal with calcium 9.5, total protein 7.1 and normal LFTs. CBC with Hgb 9.5, Hct 31.1, MCV 98, platelets 247,000, WBC 11.3 with a normal differential. TSH 6.7 (ref: 0.45 - 4.5).  3. On 02/13/2019 serum iron 41, Fe sat 15% (low), TIBC 277 (250 - 450). B12 of 777, folate greater than 20.  4. 03/05/2019: MADELYN (+) 1:160 with negative reflex  5. Labs, 03/12/2019: Hemoglobin 9.4, hematocrit 30.9, .3, platelets 236,000, WBC 11,000 with a normal differential. Glucose 144, BUN 42, creatinine 1.57 (each elevated), GFR 43 (depressed), bicarb greater than 40 (elevated) otherwise normal CMP with an LDH of 379 (within reference range). Serum iron 77, saturation  26%, ferritin 90, B12 of 808, folate greater than 20. MADELYN positive 1:160 with negative reflex panel.  6. Bone marrow biopsy, 03/25/2019: Pathology performed on 03/25/2019 by [facility]. Flow Cytometry: Bone marrow aspirate: No immunophenotypic evidence for abnormal myeloid maturation, an increase in blasts or a lymphoproliferative disorder. Chromosome analysis: Normal male chromosome complement observed in all cells examined. There was no evidence of a chromosome abnormality within the limits of the technology utilized. Bone marrow morphology: Bone marrow, smears, clot, and biopsy: Hypercellular marrow with maturing trilineage hematopoiesis and megakaryocytosis. Absent storage iron in the slides examined. No diagnostic evidence of overt myelodysplasia, chronic myeloproliferative neoplasm, metastatic neoplasm, plasma cell myeloma, or lymphoma. Flow cytometry negative. Cytogenetics: 46,XY. Male karyotype.  7. Labs, 04/09/2019: Hemoglobin 8.9, hematocrit 29.8, .1, platelets 195,000, WBC 10.5 with a normal differential. Glucose 136, BUN 36, creatinine 1.38, bicarb greater than 40 (each elevated), GFR 50 (depressed) otherwise normal CMP. Serum iron 61, saturation 20%, ferritin 202.   8. 04/16/2019, SPIEP normal. No M-spike/negative MARYCRUZ. Kappa/lambda light chain ratio: Normal.     PREVIOUS INTERVENTIONS:   1. Nulecit 125 mg IV x4 days ending 03/29/2019.  2. Injectafer 750 mg IV x1, 10/3/2019  3. Procrit  as needed          PAST MEDICAL HISTORY:  ALLERGIES:  Allergies   Allergen Reactions   • Contrast Dye Provider Review Needed     MRI dye causes kidneys to shut down   • Metformin And Related Itching       CURRENT MEDICATIONS:  Outpatient Encounter Medications as of 10/22/2020   Medication Sig Dispense Refill   • allopurinol (ZYLOPRIM) 100 MG tablet Take 100 mg by mouth Daily.     • aspirin 81 MG EC tablet Take 81 mg by mouth Daily.     • atorvastatin (LIPITOR) 40 MG tablet Take 40 mg by mouth Daily.     •  Cholecalciferol (VITAMIN D-3) 1000 UNITS capsule Take  by mouth Daily.     • fluticasone-salmeterol (ADVAIR) 250-50 MCG/DOSE DISKUS Inhale 2 (Two) Times a Day.     • furosemide (LASIX) 40 MG tablet Take 40 mg by mouth Daily.     • levothyroxine (SYNTHROID, LEVOTHROID) 100 MCG tablet Take 100 mcg by mouth Daily.     • metoprolol succinate XL (TOPROL-XL) 100 MG 24 hr tablet Take 100 mg by mouth Daily.     • Multiple Vitamins-Minerals (MULTIVITAMIN MEN 50+ PO) Take  by mouth Daily.     • nitroglycerin (NITROSTAT) 0.4 MG SL tablet 1 under the tongue as needed for angina, may repeat q5mins for up three doses 25 tablet 1   • O2 (OXYGEN) Inhale 1 (One) Time. 2 1/2 Liters full time     • Omega-3 Fatty Acids (FISH OIL BURP-LESS) 1200 MG capsule Take  by mouth Daily.     • valsartan-hydrochlorothiazide (DIOVAN-HCT) 320-12.5 MG per tablet Take 1 tablet by mouth Daily.     • [DISCONTINUED] Ferrous Sulfate (IRON PO) Take  by mouth.       Facility-Administered Encounter Medications as of 10/22/2020   Medication Dose Route Frequency Provider Last Rate Last Dose   • ferric carboxymaltose (INJECTAFER) injection solution 750 mg  750 mg Intravenous Once Aleks Tucker MD         ADULT ILLNESSES:  Patient Active Problem List   Diagnosis Code   • SOB (shortness of breath) R06.02   • CAD in native artery I25.10   • Essential hypertension I10   • Hyperlipemia E78.5   • Hx of adenomatous colonic polyps Z86.010   • Class 1 obesity due to excess calories with serious comorbidity and body mass index (BMI) of 34.0 to 34.9 in adult E66.09, Z68.34   • Coronary artery disease involving native coronary artery of native heart without angina pectoris I25.10   • On home oxygen therapy Z99.81   • History of COPD Z87.09   • Iron deficiency anemia D50.9   • Stage 3 chronic kidney disease N18.30   • Anemia due to stage 3 chronic kidney disease N18.30, D63.1   • Function kidney decreased N28.9     SURGERIES:  Past Surgical History:   Procedure  Laterality Date   • BACK SURGERY     • CARDIAC CATHETERIZATION Left 10/06/2005    ct surgery consult pending   • CIRCUMCISION     • COLONOSCOPY  02/09/2015    Adenomatous polyp hepatic flexure, 2 hyperplastic polyps at 60 and 30 cm, Diverticulosis repeat exam in 3 years   • CORONARY ARTERY BYPASS GRAFT  04/14/2009    x4, LIMA to LAD, SVG to the intermediate branch, obtuse marginal branch, posterior anterior descending artery   • SPINAL FUSION       FAMILY HISTORY:  Family History   Problem Relation Age of Onset   • Hypertension Mother    • Heart attack Father    • Liver cancer Father    • Hypertension Sister    • Hypertension Brother    • Colon cancer Neg Hx    • Colon polyps Neg Hx      SOCIAL HISTORY:  Social History     Socioeconomic History   • Marital status:      Spouse name: Not on file   • Number of children: Not on file   • Years of education: Not on file   • Highest education level: Not on file   Tobacco Use   • Smoking status: Never Smoker   • Smokeless tobacco: Never Used   Substance and Sexual Activity   • Alcohol use: Yes     Comment: rarely   • Drug use: No   • Sexual activity: Defer       REVIEW OF SYSTEMS:  Review of Systems   Constitutional: Positive for fatigue. Negative for activity change, appetite change, chills, diaphoresis, fever and unexpected weight loss.   HENT: Negative for ear pain, nosebleeds, sinus pressure, sore throat and voice change.    Eyes: Negative for blurred vision, double vision, pain and visual disturbance.   Respiratory: Negative for cough and shortness of breath.    Cardiovascular: Positive for leg swelling. Negative for chest pain and palpitations.   Gastrointestinal: Negative for abdominal pain, anal bleeding, blood in stool, constipation, diarrhea, nausea and vomiting.   Endocrine: Negative for heat intolerance, polydipsia and polyuria.   Genitourinary: Negative for dysuria, frequency, hematuria, urgency and urinary incontinence.   Musculoskeletal: Positive for  "arthralgias and myalgias.   Skin: Negative for rash and skin lesions.   Neurological: Positive for weakness. Negative for dizziness, tremors, seizures, syncope, speech difficulty and headache.   Hematological: Negative for adenopathy. Does not bruise/bleed easily.   Psychiatric/Behavioral: Negative for dysphoric mood, sleep disturbance, suicidal ideas and depressed mood.       /72   Pulse 68   Temp 98 °F (36.7 °C) (Temporal)   Resp 16   Ht 182.9 cm (72\")   Wt 104 kg (229 lb)   SpO2 95%   BMI 31.06 kg/m²  Body surface area is 2.26 meters squared.  Pain Score    10/22/20 1132   PainSc: 0-No pain       Physical Exam:  Physical Exam  Vitals signs reviewed.   Constitutional:       Appearance: Normal appearance. He is well-developed.   HENT:      Head: Atraumatic.   Eyes:      General: No scleral icterus.     Pupils: Pupils are equal, round, and reactive to light.   Neck:      Musculoskeletal: Neck supple.      Trachea: Trachea normal.   Cardiovascular:      Rate and Rhythm: Normal rate and regular rhythm.      Heart sounds: No murmur. No friction rub. No gallop.    Pulmonary:      Effort: Pulmonary effort is normal.      Breath sounds: Normal breath sounds. No wheezing, rhonchi or rales.   Abdominal:      Palpations: Abdomen is soft.      Tenderness: There is no abdominal tenderness. There is no guarding or rebound.   Neurological:      Mental Status: He is alert and oriented to person, place, and time.      Sensory: No sensory deficit.   Psychiatric:         Judgment: Judgment normal.       Kashif R Milan reports a pain score of 0.      Patient's Body mass index is 31.06 kg/m². BMI is above normal parameters. Recommendations include: defer to pcp.    LABS    Lab Results - Last 18 Months   Lab Units 10/22/20  1059 09/17/20  1044 08/20/20  1042 03/05/20  1005 02/20/20  1035 02/06/20  1037   HEMOGLOBIN g/dL 9.7* 9.0* 8.6* 9.7* 9.3* 9.4*   HEMATOCRIT % 34.0* 31.9* 29.4* 33.9* 32.2* 32.9*   MCV fL 107.6* 108.9* " 107.3* 106.9* 107.3* 107.9*   WBC 10*3/mm3 9.44 9.54 12.15* 8.34 9.19 11.50*   RDW % 14.1 13.8 12.7 13.9 14.0 13.3   MPV fL 9.9 9.5 10.2 9.6 9.3 9.4   PLATELETS 10*3/mm3 186 186 209 205 202 202   IMM GRAN % % 0.6* 0.2 0.4 0.1 0.2 0.2   NEUTROS ABS 10*3/mm3 6.50 6.28 8.53* 5.86 5.79 8.20*   LYMPHS ABS 10*3/mm3 1.91 2.17 2.49 1.69 2.22 2.19   MONOS ABS 10*3/mm3 0.68 0.76 0.86 0.54 0.79 0.81   EOS ABS 10*3/mm3 0.25 0.26 0.18 0.19 0.32 0.24   BASOS ABS 10*3/mm3 0.04 0.05 0.04 0.05 0.05 0.04   IMMATURE GRANS (ABS) 10*3/mm3 0.06* 0.02 0.05 0.01 0.02 0.02       Lab Results - Last 18 Months   Lab Units 10/22/20  1059 09/17/20  1044 08/20/20  1042 03/05/20  1005 12/10/19  1057 09/24/19  1057   GLUCOSE mg/dL 182* 86 137* 161* 72 172*   SODIUM mmol/L 142 144 144 144 144 143   POTASSIUM mmol/L 4.8 4.8 4.4 4.6 4.4 4.7   CO2 mmol/L 43.0* 40.0* 40.0* 42.0* 42.0* 42.0*   CHLORIDE mmol/L 95* 97* 94* 95* 95* 93*   ANION GAP mmol/L 4.0* 7.0 10.0 7.0 7.0 8.0   CREATININE mg/dL 1.64* 1.26 1.47* 1.40* 1.40* 1.25   BUN mg/dL 36* 28* 38* 29* 38* 34*   BUN / CREAT RATIO  22.0 22.2 25.9* 20.7 27.1* 27.2*   CALCIUM mg/dL 9.4 9.3 9.6 9.3 9.9 9.4   EGFR IF NONAFRICN AM mL/min/1.73 41* 55* 46* 49* 49* 56*   ALK PHOS U/L 81 78 73 71 67 76   TOTAL PROTEIN g/dL 7.3 7.3 7.4 7.3 7.7 7.5   ALT (SGPT) U/L 9 10 10 8 10 11   AST (SGOT) U/L 18 17 15 15 16 17   BILIRUBIN mg/dL 0.4 0.3 0.3 0.4 0.3 0.3   ALBUMIN g/dL 4.10 4.10 4.10 3.90 4.20 3.80   GLOBULIN gm/dL 3.2 3.2 3.3 3.4 3.5 3.7       Lab Results - Last 18 Months   Lab Units 10/22/20  1059 09/17/20  1044 08/20/20  1042 03/05/20  1005 12/10/19  1057 09/24/19  1057   IRON mcg/dL 72 44* 32* 30* 66 56*   TIBC mcg/dL 258* 268* 294* 267* 297* 292*   IRON SATURATION % 28 16* 11* 11* 22 19*   FERRITIN ng/mL 955.60* 721.10* 256.80 183.20 349.00 199.00     ASSESSMENT:   1.  Normocytic/macrocytic anemia. Hgb 9.7; .6, 10/22/2020 (prior: Hgb 8.6 -10; MCV 98 - 105.7).    a. Contribution from chronic kidney  disease, hypothyroidism, iron underutilization/anemia of chronic disease and (now repleted) iron    deficiency.    b. Bone marrow biopsy, 03/25/2019 (above). Hypercellular marrow with maturing trilineage hematopoiesis and megakaryocytosis.      Absent storage iron in the slides examined. No diagnostic evidence of overt myelodysplasia, chronic myeloproliferative neoplasm, metastatic neoplasm, plasma cell myeloma or lymphoma. Flow cytometry negative. Cytogenetics: 46,XY. Male karyotype.  2.  Chronic kidney disease, Stage III. GFR 41 mL/min, 10/22/2020 (prior: 43 - 61 mL/minute). Followed by Dr. Medel.   3.  MADELYN (+) 1:160. Denies arthralgias or dry mouth.  Recurrent.    4.  Coronary artery disease with prior stents and coronary artery bypass grafting (CABG).   5.  Hyperlipidemia.   6.  Hypothyroidism on replacement.   7.  Diabetes mellitus type 2 ( ICD-10:E11.9 ;Type 2 diabetes mellitus without complications   8.  History of acute renal failure (ARF) on short term dialysis due to IVP dye.   9.  History of paroxysmal atrial fibrillation (PAF) on Coumadin in the past now in normal sinus rhythm (NSR).   10. 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   11. Asthma - stable.   12. Restrictive lung disease on O2.   13. 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   14. Colon polyps (colonoscopy, 2015).     RECOMMENDATIONS:   1.  Discussed lab work obtained on 10/22/2020  2.  Discussed the iron studies that are pending and if saturation is >20% then can consider procrit therapy  3.  Will have the patient return for procrit therapy if iron saturation is at 20% and hgb and hct meet guidelines.   4.  Return every 4 weeks for repeat labs and possible procrit therapy.    5.  Return in 12 weeks for follow up with preoffice CMP, serum iron, Fe sat, ferritin, CBC and differential.     I spent 22 total minutes, face-to-face, caring for Kashif today.  Greater than 50% of this time involved counseling and/or coordination of care as  documented within this note regarding the patient's illness(es), pros and cons of various treatment options, instructions and/or risk reduction.    Aisha Malave, APRN  10/22/2020       cc: Cher Negrete MD (referring)         MD Alex Cleary MD

## 2020-10-23 ENCOUNTER — TELEPHONE (OUTPATIENT)
Dept: ONCOLOGY | Facility: CLINIC | Age: 80
End: 2020-10-23

## 2020-10-23 ENCOUNTER — INFUSION (OUTPATIENT)
Dept: ONCOLOGY | Facility: HOSPITAL | Age: 80
End: 2020-10-23

## 2020-10-23 VITALS
HEART RATE: 66 BPM | RESPIRATION RATE: 16 BRPM | WEIGHT: 229 LBS | HEIGHT: 72 IN | SYSTOLIC BLOOD PRESSURE: 135 MMHG | OXYGEN SATURATION: 99 % | DIASTOLIC BLOOD PRESSURE: 58 MMHG | TEMPERATURE: 97.7 F | BODY MASS INDEX: 31.02 KG/M2

## 2020-10-23 DIAGNOSIS — N28.9 FUNCTION KIDNEY DECREASED: Primary | ICD-10-CM

## 2020-10-23 DIAGNOSIS — D50.9 IRON DEFICIENCY ANEMIA, UNSPECIFIED IRON DEFICIENCY ANEMIA TYPE: Primary | ICD-10-CM

## 2020-10-23 PROCEDURE — 96361 HYDRATE IV INFUSION ADD-ON: CPT

## 2020-10-23 PROCEDURE — 96360 HYDRATION IV INFUSION INIT: CPT

## 2020-10-23 RX ORDER — SODIUM CHLORIDE 9 MG/ML
1000 INJECTION, SOLUTION INTRAVENOUS ONCE
Status: CANCELLED | OUTPATIENT
Start: 2020-10-23

## 2020-10-23 RX ORDER — SODIUM CHLORIDE 9 MG/ML
1000 INJECTION, SOLUTION INTRAVENOUS ONCE
Status: COMPLETED | OUTPATIENT
Start: 2020-10-23 | End: 2020-10-23

## 2020-10-23 RX ADMIN — SODIUM CHLORIDE 1000 ML: 9 INJECTION, SOLUTION INTRAVENOUS at 13:55

## 2020-10-23 NOTE — TELEPHONE ENCOUNTER
----- Message from Aleks Tucker MD sent at 10/22/2020  8:37 PM CDT -----  Labs 10/22/2020–BUN 36, creatinine 1.64, GFR 41 (from 55).  Please: 1.administer 1 L normal saline over 2 hours daily x2 then recheck CMP after the second liter.  Thank you

## 2020-10-23 NOTE — TELEPHONE ENCOUNTER
Patient is agreeable to 2 days of IV fluids. Pt scheduled for today at 1:30 and will be scheduled for Monday. CMP to follow fluids on Monday.

## 2020-10-26 ENCOUNTER — INFUSION (OUTPATIENT)
Dept: ONCOLOGY | Facility: HOSPITAL | Age: 80
End: 2020-10-26

## 2020-10-26 VITALS
WEIGHT: 228 LBS | HEART RATE: 70 BPM | TEMPERATURE: 97.9 F | HEIGHT: 72 IN | BODY MASS INDEX: 30.88 KG/M2 | OXYGEN SATURATION: 100 % | SYSTOLIC BLOOD PRESSURE: 121 MMHG | DIASTOLIC BLOOD PRESSURE: 50 MMHG | RESPIRATION RATE: 17 BRPM

## 2020-10-26 DIAGNOSIS — N28.9 FUNCTION KIDNEY DECREASED: Primary | ICD-10-CM

## 2020-10-26 PROCEDURE — 96361 HYDRATE IV INFUSION ADD-ON: CPT

## 2020-10-26 PROCEDURE — 96360 HYDRATION IV INFUSION INIT: CPT

## 2020-10-26 RX ORDER — SODIUM CHLORIDE 9 MG/ML
1000 INJECTION, SOLUTION INTRAVENOUS ONCE
Status: COMPLETED | OUTPATIENT
Start: 2020-10-26 | End: 2020-10-26

## 2020-10-26 RX ORDER — SODIUM CHLORIDE 9 MG/ML
1000 INJECTION, SOLUTION INTRAVENOUS ONCE
OUTPATIENT
Start: 2020-10-26

## 2020-10-26 RX ADMIN — SODIUM CHLORIDE 1000 ML: 9 INJECTION, SOLUTION INTRAVENOUS at 13:10

## 2020-10-29 ENCOUNTER — CLINICAL SUPPORT (OUTPATIENT)
Dept: ONCOLOGY | Facility: CLINIC | Age: 80
End: 2020-10-29

## 2020-10-29 ENCOUNTER — LAB (OUTPATIENT)
Dept: ONCOLOGY | Facility: CLINIC | Age: 80
End: 2020-10-29

## 2020-10-29 VITALS
RESPIRATION RATE: 18 BRPM | HEART RATE: 64 BPM | OXYGEN SATURATION: 98 % | TEMPERATURE: 98.4 F | SYSTOLIC BLOOD PRESSURE: 152 MMHG | DIASTOLIC BLOOD PRESSURE: 70 MMHG

## 2020-10-29 DIAGNOSIS — N18.30 STAGE 3 CHRONIC KIDNEY DISEASE, UNSPECIFIED WHETHER STAGE 3A OR 3B CKD (HCC): ICD-10-CM

## 2020-10-29 DIAGNOSIS — D63.1 ANEMIA DUE TO STAGE 3 CHRONIC KIDNEY DISEASE, UNSPECIFIED WHETHER STAGE 3A OR 3B CKD (HCC): Primary | ICD-10-CM

## 2020-10-29 DIAGNOSIS — N18.30 ANEMIA DUE TO STAGE 3 CHRONIC KIDNEY DISEASE (HCC): ICD-10-CM

## 2020-10-29 DIAGNOSIS — D63.1 ANEMIA DUE TO STAGE 3 CHRONIC KIDNEY DISEASE (HCC): ICD-10-CM

## 2020-10-29 DIAGNOSIS — N18.32 ANEMIA DUE TO STAGE 3B CHRONIC KIDNEY DISEASE (HCC): ICD-10-CM

## 2020-10-29 DIAGNOSIS — D63.1 ANEMIA DUE TO STAGE 3B CHRONIC KIDNEY DISEASE (HCC): ICD-10-CM

## 2020-10-29 DIAGNOSIS — N18.32 STAGE 3B CHRONIC KIDNEY DISEASE (HCC): ICD-10-CM

## 2020-10-29 DIAGNOSIS — N18.30 ANEMIA DUE TO STAGE 3 CHRONIC KIDNEY DISEASE, UNSPECIFIED WHETHER STAGE 3A OR 3B CKD (HCC): Primary | ICD-10-CM

## 2020-10-29 LAB
BASOPHILS # BLD AUTO: 0.08 10*3/MM3 (ref 0–0.2)
BASOPHILS NFR BLD AUTO: 0.9 % (ref 0–1.5)
DEPRECATED RDW RBC AUTO: 55.5 FL (ref 37–54)
EOSINOPHIL # BLD AUTO: 0.22 10*3/MM3 (ref 0–0.4)
EOSINOPHIL NFR BLD AUTO: 2.4 % (ref 0.3–6.2)
ERYTHROCYTE [DISTWIDTH] IN BLOOD BY AUTOMATED COUNT: 13.9 % (ref 12.3–15.4)
HCT VFR BLD AUTO: 31.7 % (ref 37.5–51)
HGB BLD-MCNC: 9 G/DL (ref 13–17.7)
IMM GRANULOCYTES # BLD AUTO: 0.01 10*3/MM3 (ref 0–0.05)
IMM GRANULOCYTES NFR BLD AUTO: 0.1 % (ref 0–0.5)
LYMPHOCYTES # BLD AUTO: 1.79 10*3/MM3 (ref 0.7–3.1)
LYMPHOCYTES NFR BLD AUTO: 19.2 % (ref 19.6–45.3)
MCH RBC QN AUTO: 30.6 PG (ref 26.6–33)
MCHC RBC AUTO-ENTMCNC: 28.4 G/DL (ref 31.5–35.7)
MCV RBC AUTO: 107.8 FL (ref 79–97)
MONOCYTES # BLD AUTO: 0.77 10*3/MM3 (ref 0.1–0.9)
MONOCYTES NFR BLD AUTO: 8.3 % (ref 5–12)
NEUTROPHILS NFR BLD AUTO: 6.45 10*3/MM3 (ref 1.7–7)
NEUTROPHILS NFR BLD AUTO: 69.1 % (ref 42.7–76)
PLATELET # BLD AUTO: 168 10*3/MM3 (ref 140–450)
PMV BLD AUTO: 9.5 FL (ref 6–12)
RBC # BLD AUTO: 2.94 10*6/MM3 (ref 4.14–5.8)
WBC # BLD AUTO: 9.32 10*3/MM3 (ref 3.4–10.8)

## 2020-10-29 PROCEDURE — 36415 COLL VENOUS BLD VENIPUNCTURE: CPT | Performed by: NURSE PRACTITIONER

## 2020-10-29 PROCEDURE — 85025 COMPLETE CBC W/AUTO DIFF WBC: CPT | Performed by: NURSE PRACTITIONER

## 2020-10-29 PROCEDURE — 96372 THER/PROPH/DIAG INJ SC/IM: CPT | Performed by: NURSE PRACTITIONER

## 2020-10-29 NOTE — PROGRESS NOTES
Patient here for lab evaluation for possible Retacrit injection for anemia due to stage III chronic kidney disease.  States that he is feeling ok today.  Gets tired with minimal exertion.  Skin w/d to touch.  Color wnl.  Eating and drinking well.  Parameters set for Retacrit 40,000 units for Hgb <11 and Hct <33.  Last injection was on 09/17-patient states that he tolerated it well without side effects.  Reviewed labs with patient, Hgb 9.0 and Hct 31.7 today.  Will give Retacrit 40,000 units SQ per DARRIAN Roman orders.  Will return in two weeks for same.  Instructed to call with any problems.  Patient v/u.

## 2020-11-05 ENCOUNTER — APPOINTMENT (OUTPATIENT)
Dept: LAB | Facility: HOSPITAL | Age: 80
End: 2020-11-05

## 2020-11-12 ENCOUNTER — CLINICAL SUPPORT (OUTPATIENT)
Dept: ONCOLOGY | Facility: CLINIC | Age: 80
End: 2020-11-12

## 2020-11-12 ENCOUNTER — LAB (OUTPATIENT)
Dept: ONCOLOGY | Facility: CLINIC | Age: 80
End: 2020-11-12

## 2020-11-12 VITALS
HEART RATE: 56 BPM | OXYGEN SATURATION: 94 % | RESPIRATION RATE: 18 BRPM | DIASTOLIC BLOOD PRESSURE: 64 MMHG | SYSTOLIC BLOOD PRESSURE: 104 MMHG | TEMPERATURE: 98.7 F

## 2020-11-12 DIAGNOSIS — D63.1 ANEMIA DUE TO STAGE 3A CHRONIC KIDNEY DISEASE (HCC): ICD-10-CM

## 2020-11-12 DIAGNOSIS — N18.31 ANEMIA DUE TO STAGE 3A CHRONIC KIDNEY DISEASE (HCC): ICD-10-CM

## 2020-11-12 DIAGNOSIS — N18.31 STAGE 3A CHRONIC KIDNEY DISEASE (HCC): ICD-10-CM

## 2020-11-12 DIAGNOSIS — N18.30 ANEMIA DUE TO STAGE 3 CHRONIC KIDNEY DISEASE (HCC): ICD-10-CM

## 2020-11-12 DIAGNOSIS — D63.1 ANEMIA DUE TO STAGE 3 CHRONIC KIDNEY DISEASE (HCC): ICD-10-CM

## 2020-11-12 LAB
BASOPHILS # BLD AUTO: 0.05 10*3/MM3 (ref 0–0.2)
BASOPHILS NFR BLD AUTO: 0.6 % (ref 0–1.5)
DEPRECATED RDW RBC AUTO: 57 FL (ref 37–54)
EOSINOPHIL # BLD AUTO: 0.26 10*3/MM3 (ref 0–0.4)
EOSINOPHIL NFR BLD AUTO: 3.1 % (ref 0.3–6.2)
ERYTHROCYTE [DISTWIDTH] IN BLOOD BY AUTOMATED COUNT: 14.2 % (ref 12.3–15.4)
HCT VFR BLD AUTO: 33.1 % (ref 37.5–51)
HGB BLD-MCNC: 9.3 G/DL (ref 13–17.7)
IMM GRANULOCYTES # BLD AUTO: 0.01 10*3/MM3 (ref 0–0.05)
IMM GRANULOCYTES NFR BLD AUTO: 0.1 % (ref 0–0.5)
LYMPHOCYTES # BLD AUTO: 2 10*3/MM3 (ref 0.7–3.1)
LYMPHOCYTES NFR BLD AUTO: 23.5 % (ref 19.6–45.3)
MCH RBC QN AUTO: 30.6 PG (ref 26.6–33)
MCHC RBC AUTO-ENTMCNC: 28.1 G/DL (ref 31.5–35.7)
MCV RBC AUTO: 108.9 FL (ref 79–97)
MONOCYTES # BLD AUTO: 0.8 10*3/MM3 (ref 0.1–0.9)
MONOCYTES NFR BLD AUTO: 9.4 % (ref 5–12)
NEUTROPHILS NFR BLD AUTO: 5.4 10*3/MM3 (ref 1.7–7)
NEUTROPHILS NFR BLD AUTO: 63.3 % (ref 42.7–76)
PLATELET # BLD AUTO: 187 10*3/MM3 (ref 140–450)
PMV BLD AUTO: 10.2 FL (ref 6–12)
RBC # BLD AUTO: 3.04 10*6/MM3 (ref 4.14–5.8)
WBC # BLD AUTO: 8.52 10*3/MM3 (ref 3.4–10.8)

## 2020-11-12 PROCEDURE — 99211 OFF/OP EST MAY X REQ PHY/QHP: CPT | Performed by: NURSE PRACTITIONER

## 2020-11-12 PROCEDURE — 85025 COMPLETE CBC W/AUTO DIFF WBC: CPT | Performed by: NURSE PRACTITIONER

## 2020-11-12 NOTE — PROGRESS NOTES
Patient here for lab evaluation and possible Retacrit for anemia due to stage III chronic kidney disease.  States that he is feeling ok today.  Gets tired with minimal exertion. Skin w/d to touch.  Color wnl.  Eating and drinking well.  Wears O2 at 2L/NC continuously.  Parameters set for Retacrit 22311 units for Hgb <11 and Hct <33.  Last injection was on 10/29-patient states that he tolerated it well without side effects.  Reviewed labs with patient, Hgb 9.3 and Hct 33.1 today.  Patient does not require an injection at this time. Will return in two weeks for same.  Instructed to call with any problems.  Patient v/u.

## 2020-12-02 ENCOUNTER — TELEPHONE (OUTPATIENT)
Dept: ONCOLOGY | Facility: CLINIC | Age: 80
End: 2020-12-02

## 2021-02-04 ENCOUNTER — LAB (OUTPATIENT)
Dept: ONCOLOGY | Facility: CLINIC | Age: 81
End: 2021-02-04

## 2021-02-04 DIAGNOSIS — D63.1 ANEMIA DUE TO STAGE 3 CHRONIC KIDNEY DISEASE (HCC): ICD-10-CM

## 2021-02-04 DIAGNOSIS — N18.30 ANEMIA DUE TO STAGE 3 CHRONIC KIDNEY DISEASE (HCC): ICD-10-CM

## 2021-02-04 LAB
ALBUMIN SERPL-MCNC: 3.6 G/DL (ref 3.5–5.2)
ALBUMIN/GLOB SERPL: 1.1 G/DL
ALP SERPL-CCNC: 85 U/L (ref 39–117)
ALT SERPL W P-5'-P-CCNC: 8 U/L (ref 1–41)
ANION GAP SERPL CALCULATED.3IONS-SCNC: 0 MMOL/L (ref 5–15)
AST SERPL-CCNC: 16 U/L (ref 1–40)
BASOPHILS # BLD AUTO: 0.04 10*3/MM3 (ref 0–0.2)
BASOPHILS NFR BLD AUTO: 0.5 % (ref 0–1.5)
BILIRUB SERPL-MCNC: 0.3 MG/DL (ref 0–1.2)
BUN SERPL-MCNC: 29 MG/DL (ref 8–23)
BUN/CREAT SERPL: 24.2 (ref 7–25)
CALCIUM SPEC-SCNC: 9.7 MG/DL (ref 8.6–10.5)
CHLORIDE SERPL-SCNC: 95 MMOL/L (ref 98–107)
CO2 SERPL-SCNC: 46 MMOL/L (ref 22–29)
CREAT SERPL-MCNC: 1.2 MG/DL (ref 0.76–1.27)
DEPRECATED RDW RBC AUTO: 57 FL (ref 37–54)
EOSINOPHIL # BLD AUTO: 0.13 10*3/MM3 (ref 0–0.4)
EOSINOPHIL NFR BLD AUTO: 1.5 % (ref 0.3–6.2)
ERYTHROCYTE [DISTWIDTH] IN BLOOD BY AUTOMATED COUNT: 13.8 % (ref 12.3–15.4)
FERRITIN SERPL-MCNC: 744.3 NG/ML (ref 30–400)
GFR SERPL CREATININE-BSD FRML MDRD: 58 ML/MIN/1.73
GLOBULIN UR ELPH-MCNC: 3.3 GM/DL
GLUCOSE SERPL-MCNC: 132 MG/DL (ref 65–99)
HCT VFR BLD AUTO: 28.3 % (ref 37.5–51)
HGB BLD-MCNC: 7.7 G/DL (ref 13–17.7)
IRON 24H UR-MRATE: 39 MCG/DL (ref 59–158)
IRON SATN MFR SERPL: 17 % (ref 20–50)
LYMPHOCYTES # BLD AUTO: 1.81 10*3/MM3 (ref 0.7–3.1)
LYMPHOCYTES NFR BLD AUTO: 20.6 % (ref 19.6–45.3)
MCH RBC QN AUTO: 30.7 PG (ref 26.6–33)
MCHC RBC AUTO-ENTMCNC: 27.2 G/DL (ref 31.5–35.7)
MCV RBC AUTO: 112.7 FL (ref 79–97)
MONOCYTES # BLD AUTO: 0.65 10*3/MM3 (ref 0.1–0.9)
MONOCYTES NFR BLD AUTO: 7.4 % (ref 5–12)
NEUTROPHILS NFR BLD AUTO: 6.1 10*3/MM3 (ref 1.7–7)
NEUTROPHILS NFR BLD AUTO: 69.3 % (ref 42.7–76)
PLATELET # BLD AUTO: 230 10*3/MM3 (ref 140–450)
PMV BLD AUTO: 11.3 FL (ref 6–12)
POTASSIUM SERPL-SCNC: 5 MMOL/L (ref 3.5–5.2)
PROT SERPL-MCNC: 6.9 G/DL (ref 6–8.5)
RBC # BLD AUTO: 2.51 10*6/MM3 (ref 4.14–5.8)
SODIUM SERPL-SCNC: 141 MMOL/L (ref 136–145)
TIBC SERPL-MCNC: 235 MCG/DL (ref 298–536)
TRANSFERRIN SERPL-MCNC: 158 MG/DL (ref 200–360)
WBC # BLD AUTO: 8.79 10*3/MM3 (ref 3.4–10.8)

## 2021-02-04 PROCEDURE — 84466 ASSAY OF TRANSFERRIN: CPT | Performed by: NURSE PRACTITIONER

## 2021-02-04 PROCEDURE — 80053 COMPREHEN METABOLIC PANEL: CPT | Performed by: NURSE PRACTITIONER

## 2021-02-04 PROCEDURE — 83540 ASSAY OF IRON: CPT | Performed by: NURSE PRACTITIONER

## 2021-02-04 PROCEDURE — 82728 ASSAY OF FERRITIN: CPT | Performed by: NURSE PRACTITIONER

## 2021-02-04 PROCEDURE — 85025 COMPLETE CBC W/AUTO DIFF WBC: CPT | Performed by: NURSE PRACTITIONER

## 2021-02-05 ENCOUNTER — TELEPHONE (OUTPATIENT)
Dept: ONCOLOGY | Facility: CLINIC | Age: 81
End: 2021-02-05

## 2021-02-05 DIAGNOSIS — N18.31 STAGE 3A CHRONIC KIDNEY DISEASE (HCC): Primary | ICD-10-CM

## 2021-02-05 DIAGNOSIS — N18.31 ANEMIA DUE TO STAGE 3A CHRONIC KIDNEY DISEASE (HCC): ICD-10-CM

## 2021-02-05 DIAGNOSIS — D63.1 ANEMIA DUE TO STAGE 3A CHRONIC KIDNEY DISEASE (HCC): ICD-10-CM

## 2021-02-05 NOTE — TELEPHONE ENCOUNTER
Patient came by [place] office on 02/04 for lab evaluation. Hgb 7.7 and Hct 28.3.  Called patient with results-states that he is more SOA and weak.  Talked to Dr Tucker-wants patient to get 1 unit blood today.  This has been set up at [facility] [place] [place].  Patient v/u and will get ready and go to [facility] [place] [place] now.  Instructed to go to [facility].

## 2021-09-22 ENCOUNTER — TELEPHONE (OUTPATIENT)
Dept: ONCOLOGY | Facility: CLINIC | Age: 81
End: 2021-09-22

## 2021-09-22 NOTE — TELEPHONE ENCOUNTER
Tried calling patient to see about scheduling with Aisha, patient hasn't been seen since last year. Mobile number has been disconnected and home number did not have voicemail.

## 2021-09-22 NOTE — TELEPHONE ENCOUNTER
----- Message from Pradeep Gomez CMA sent at 9/22/2021  8:45 AM CDT -----  PT DOES NOT HAVE F/U APPT     ----- Message -----  From: SYSTEM  Sent: 9/22/2021  12:24 AM CDT  To: [facility]